# Patient Record
Sex: FEMALE | Race: BLACK OR AFRICAN AMERICAN | NOT HISPANIC OR LATINO
[De-identification: names, ages, dates, MRNs, and addresses within clinical notes are randomized per-mention and may not be internally consistent; named-entity substitution may affect disease eponyms.]

---

## 2018-12-11 ENCOUNTER — APPOINTMENT (OUTPATIENT)
Dept: SURGERY | Facility: CLINIC | Age: 23
End: 2018-12-11

## 2018-12-21 ENCOUNTER — APPOINTMENT (OUTPATIENT)
Dept: SURGERY | Facility: CLINIC | Age: 23
End: 2018-12-21

## 2019-01-29 ENCOUNTER — APPOINTMENT (OUTPATIENT)
Dept: SURGERY | Facility: CLINIC | Age: 24
End: 2019-01-29

## 2019-05-28 ENCOUNTER — APPOINTMENT (OUTPATIENT)
Dept: SURGERY | Facility: CLINIC | Age: 24
End: 2019-05-28

## 2019-07-23 ENCOUNTER — APPOINTMENT (OUTPATIENT)
Dept: SURGERY | Facility: CLINIC | Age: 24
End: 2019-07-23

## 2019-07-30 ENCOUNTER — APPOINTMENT (OUTPATIENT)
Dept: SURGERY | Facility: CLINIC | Age: 24
End: 2019-07-30
Payer: COMMERCIAL

## 2019-07-30 VITALS
WEIGHT: 250.5 LBS | DIASTOLIC BLOOD PRESSURE: 80 MMHG | HEIGHT: 62.5 IN | TEMPERATURE: 97.6 F | OXYGEN SATURATION: 99 % | BODY MASS INDEX: 44.94 KG/M2 | HEART RATE: 62 BPM | SYSTOLIC BLOOD PRESSURE: 118 MMHG

## 2019-07-30 DIAGNOSIS — E66.01 MORBID (SEVERE) OBESITY DUE TO EXCESS CALORIES: ICD-10-CM

## 2019-07-30 PROCEDURE — 99204 OFFICE O/P NEW MOD 45 MIN: CPT

## 2019-07-30 NOTE — END OF VISIT
[FreeTextEntry3] : All medical record entries made by the Scribe were at my, Dr. Banks's direction and personally dictated by me on 07/30/2019  I have reviewed the chart and agree that the record accurately reflects my personal performance of the history, physical exam, assessment and plan. I have also personally directed, reviewed, and agreed with the chart.\par \par

## 2019-07-30 NOTE — HISTORY OF PRESENT ILLNESS
[de-identified] : 24 y/o F presents here today for initial consultation for bariatric surgery. She initially consulted with  in 2016 but decided not to undergo the work up at the time. She states that she has struggled with her weight most of her life. She has attempted multiple diets, exercises with little success, often regaining any weight that she lost. She does not report any significant PMHx, not currently taking any medications. No PSHx. NKDA. Patient smokes recreational marijuana and is a social drinker. Additionally, she states that she has had 4 monthly weigh ins with her PCP and will send the results here. She is interested in VSG. She denies any acid reflux or heartburn.

## 2019-07-30 NOTE — PHYSICAL EXAM
[Obese, well nourished, in no acute distress] : obese, well nourished, in no acute distress [Normal] : affect appropriate [Obese] : obese

## 2019-07-30 NOTE — ASSESSMENT
[FreeTextEntry1] : 24 y/o F with interest in bariatric surgery. Discussed the surgical options available in bariatric surgery as well as the work up process required prior to determining which surgical treatment would be best for the patient. Risk, benefits, and alternatives to the proposed procedures were discussed with the patient and all questions were answered to their satisfaction. Patient is interested in VSG. Will have patient begin work up for this. She will continue to follow up here as she completes her work up studies and discuss the results and determine the plan for her bariatric surgery.\par

## 2019-07-30 NOTE — PLAN
[FreeTextEntry1] : Begin work up for bariatric surgery.\par Pt must meet  before receiving surgery date

## 2019-07-30 NOTE — ADDENDUM
[FreeTextEntry1] : Documented by Nakul Millard acting as a scribe for Dr. Trev Banks on 07/30/2019\par

## 2019-09-03 ENCOUNTER — APPOINTMENT (OUTPATIENT)
Dept: BARIATRICS | Facility: CLINIC | Age: 24
End: 2019-09-03

## 2019-09-05 ENCOUNTER — APPOINTMENT (OUTPATIENT)
Dept: BARIATRICS | Facility: CLINIC | Age: 24
End: 2019-09-05

## 2019-12-19 ENCOUNTER — APPOINTMENT (OUTPATIENT)
Dept: BARIATRICS | Facility: CLINIC | Age: 24
End: 2019-12-19

## 2020-01-21 ENCOUNTER — APPOINTMENT (OUTPATIENT)
Dept: BARIATRICS | Facility: CLINIC | Age: 25
End: 2020-01-21

## 2020-01-21 VITALS — WEIGHT: 241.13 LBS | BODY MASS INDEX: 43.26 KG/M2 | HEIGHT: 62.5 IN

## 2020-01-27 ENCOUNTER — APPOINTMENT (OUTPATIENT)
Dept: BARIATRICS | Facility: CLINIC | Age: 25
End: 2020-01-27
Payer: COMMERCIAL

## 2020-01-27 VITALS — WEIGHT: 236.13 LBS | HEIGHT: 62.5 IN | BODY MASS INDEX: 42.37 KG/M2

## 2020-01-27 PROCEDURE — 97802 MEDICAL NUTRITION INDIV IN: CPT

## 2020-01-31 ENCOUNTER — APPOINTMENT (OUTPATIENT)
Dept: SURGERY | Facility: CLINIC | Age: 25
End: 2020-01-31

## 2020-02-25 ENCOUNTER — APPOINTMENT (OUTPATIENT)
Dept: SURGERY | Facility: CLINIC | Age: 25
End: 2020-02-25

## 2020-07-21 ENCOUNTER — APPOINTMENT (OUTPATIENT)
Dept: SURGERY | Facility: CLINIC | Age: 25
End: 2020-07-21

## 2020-07-21 ENCOUNTER — TRANSCRIPTION ENCOUNTER (OUTPATIENT)
Age: 25
End: 2020-07-21

## 2021-01-22 ENCOUNTER — APPOINTMENT (OUTPATIENT)
Dept: SURGERY | Facility: CLINIC | Age: 26
End: 2021-01-22

## 2022-09-15 ENCOUNTER — APPOINTMENT (OUTPATIENT)
Dept: BARIATRICS | Facility: CLINIC | Age: 27
End: 2022-09-15
Payer: SELF-PAY

## 2022-09-15 VITALS
DIASTOLIC BLOOD PRESSURE: 84 MMHG | HEIGHT: 62 IN | TEMPERATURE: 97.2 F | BODY MASS INDEX: 44.16 KG/M2 | WEIGHT: 240 LBS | OXYGEN SATURATION: 84 % | SYSTOLIC BLOOD PRESSURE: 124 MMHG | HEART RATE: 66 BPM

## 2022-09-15 PROCEDURE — 99204 OFFICE O/P NEW MOD 45 MIN: CPT

## 2022-09-15 PROCEDURE — 99203 OFFICE O/P NEW LOW 30 MIN: CPT

## 2022-09-15 PROCEDURE — 99214 OFFICE O/P EST MOD 30 MIN: CPT

## 2022-09-28 NOTE — ASSESSMENT
[FreeTextEntry1] : Rohit is a 25 y/o F with a BMI of 43 and no previous medical or surgical history who is here today for initial bariatric consult.  Discussed all the options of bariatric surgery with the patient. As the patient has spoken to  and  initally, she is familiar with the bariatric workup and procedure. Will begin the bariatric workup and decide the course of surgery on completion.

## 2022-09-28 NOTE — ADDENDUM
[FreeTextEntry1] : Documented by Richie Valentin acting as a scribe for RICHIE Ibarra on 09/15/2022\par

## 2022-09-28 NOTE — HISTORY OF PRESENT ILLNESS
[de-identified] : Rohit is a 25 y/o F with a BMI of 43 and no previous medical or surgical history who is here today for initial bariatric consult. Seen by  and  initally. Had started the workup but discontinued due to pregnancy. Denies reflux and heartburn. \par \par We discussed at length surgical and non-surgical options and that non surgical approaches are unlikely to lead to long term, sustained weight loss. We also discussed that surgery alone is unlikely to be successful but should rather be seen as a tool for weight loss to be integrated with physical activity and nutritional counseling. The patient verbalized understanding and agrees to proceed with the evaluation. All risks of surgery were explained to the patient including the risks of leaks, infections, blood clots and death.\par \par

## 2022-09-28 NOTE — END OF VISIT
[FreeTextEntry3] : All medical record entries made by the Scribe were at my, RICHIE Ibarra , direction and personally dictated by me on 09/15/2022 . I have reviewed the chart and agree that the record accurately reflects my personal performance of the history, physical exam, assessment and plan. I have also personally directed, reviewed, and agreed with the chart.\par  [Time Spent: ___ minutes] : I have spent [unfilled] minutes of time on the encounter.

## 2022-11-09 ENCOUNTER — NON-APPOINTMENT (OUTPATIENT)
Age: 27
End: 2022-11-09

## 2022-11-09 VITALS — WEIGHT: 245 LBS | BODY MASS INDEX: 45.08 KG/M2 | HEIGHT: 62 IN

## 2022-11-29 ENCOUNTER — APPOINTMENT (OUTPATIENT)
Dept: BARIATRICS | Facility: CLINIC | Age: 27
End: 2022-11-29
Payer: MEDICAID

## 2022-11-29 PROCEDURE — 97803 MED NUTRITION INDIV SUBSEQ: CPT | Mod: 95

## 2022-12-07 ENCOUNTER — NON-APPOINTMENT (OUTPATIENT)
Age: 27
End: 2022-12-07

## 2022-12-17 VITALS — WEIGHT: 266 LBS | HEIGHT: 62 IN | BODY MASS INDEX: 48.95 KG/M2

## 2022-12-27 ENCOUNTER — APPOINTMENT (OUTPATIENT)
Dept: BARIATRICS | Facility: CLINIC | Age: 27
End: 2022-12-27

## 2022-12-27 ENCOUNTER — NON-APPOINTMENT (OUTPATIENT)
Age: 27
End: 2022-12-27

## 2023-01-23 VITALS — WEIGHT: 256.38 LBS | BODY MASS INDEX: 47.18 KG/M2 | HEIGHT: 62 IN

## 2023-01-27 ENCOUNTER — APPOINTMENT (OUTPATIENT)
Dept: BARIATRICS | Facility: CLINIC | Age: 28
End: 2023-01-27
Payer: MEDICAID

## 2023-01-27 PROCEDURE — 97803 MED NUTRITION INDIV SUBSEQ: CPT | Mod: 95

## 2023-02-06 ENCOUNTER — APPOINTMENT (OUTPATIENT)
Dept: BARIATRICS | Facility: CLINIC | Age: 28
End: 2023-02-06

## 2023-02-27 ENCOUNTER — APPOINTMENT (OUTPATIENT)
Dept: BARIATRICS | Facility: CLINIC | Age: 28
End: 2023-02-27

## 2023-04-05 ENCOUNTER — APPOINTMENT (OUTPATIENT)
Dept: PULMONOLOGY | Facility: CLINIC | Age: 28
End: 2023-04-05

## 2023-04-27 ENCOUNTER — APPOINTMENT (OUTPATIENT)
Dept: PULMONOLOGY | Facility: CLINIC | Age: 28
End: 2023-04-27

## 2023-05-12 VITALS — WEIGHT: 262 LBS | BODY MASS INDEX: 48.21 KG/M2 | HEIGHT: 62 IN

## 2023-06-15 VITALS — WEIGHT: 265 LBS | BODY MASS INDEX: 48.76 KG/M2 | HEIGHT: 62 IN

## 2023-07-15 VITALS — WEIGHT: 253 LBS | HEIGHT: 62 IN | BODY MASS INDEX: 46.56 KG/M2

## 2023-07-20 ENCOUNTER — APPOINTMENT (OUTPATIENT)
Dept: BARIATRICS | Facility: CLINIC | Age: 28
End: 2023-07-20
Payer: MEDICAID

## 2023-07-20 VITALS
TEMPERATURE: 97.6 F | SYSTOLIC BLOOD PRESSURE: 105 MMHG | OXYGEN SATURATION: 96 % | DIASTOLIC BLOOD PRESSURE: 72 MMHG | BODY MASS INDEX: 45.08 KG/M2 | WEIGHT: 245 LBS | HEART RATE: 71 BPM | HEIGHT: 62 IN

## 2023-07-20 PROCEDURE — 99214 OFFICE O/P EST MOD 30 MIN: CPT

## 2023-07-21 NOTE — HISTORY OF PRESENT ILLNESS
[de-identified] : Pt is a 26 y/o F with pmhx of morbid obesity BMI 44, who presents today feeling well here for f/u on her bariatric workup. Pt started the process last year and has completed all of her monthly weigh ins and nutrition education classes. Pt has not yet completed her remainder preoperative examinations and is requesting new orders to be provided to her today so she can continue through our bariatric process. Pt denies any GERD symptoms and expresses interest in the VSG. Pt has been very successful in losing wt in this preop process which we strongly enocurage. New orders to be provided to pt today and she will f/u upon completion. We discussed at length surgical and non-surgical options and that non surgical approaches are unlikely to lead to long term, sustained weight loss. We also discussed that surgery alone is unlikely to be successful but should rather be seen as a tool for weight loss to be integrated with physical activity and nutritional counseling. The patient verbalized understanding and agrees to proceed with the evaluation. All risks of surgery were explained to the patient including the risks of leaks, infections, blood clots and death.\par

## 2023-07-21 NOTE — PLAN
[FreeTextEntry1] : new bariatric orders, weigh ins and nutrition education classes completed\par f/u after testings are completed for final visit\par VSG

## 2023-07-21 NOTE — ASSESSMENT
[FreeTextEntry1] : Pt is a 28 y/o F with pmhx of morbid obesity BMI 44, who presents today feeling well here for f/u on her bariatric workup. Pt started the process last year and has completed all of her monthly weigh ins and nutrition education classes. Pt has not yet completed her remainder preoperative examinations and is requesting new orders to be provided to her today so she can continue through our bariatric process. Pt denies any GERD symptoms and expresses interest in the VSG. Pt has been very successful in losing wt in this preop process which we strongly enocurage. New orders to be provided to pt today and she will f/u upon completion. We discussed at length surgical and non-surgical options and that non surgical approaches are unlikely to lead to long term, sustained weight loss. We also discussed that surgery alone is unlikely to be successful but should rather be seen as a tool for weight loss to be integrated with physical activity and nutritional counseling. The patient verbalized understanding and agrees to proceed with the evaluation. All risks of surgery were explained to the patient including the risks of leaks, infections, blood clots and death.\par

## 2023-08-10 ENCOUNTER — APPOINTMENT (OUTPATIENT)
Dept: PULMONOLOGY | Facility: CLINIC | Age: 28
End: 2023-08-10

## 2023-08-11 ENCOUNTER — OUTPATIENT (OUTPATIENT)
Dept: OUTPATIENT SERVICES | Facility: HOSPITAL | Age: 28
LOS: 1 days | End: 2023-08-11
Payer: COMMERCIAL

## 2023-08-11 ENCOUNTER — APPOINTMENT (OUTPATIENT)
Dept: RADIOLOGY | Facility: HOSPITAL | Age: 28
End: 2023-08-11
Payer: MEDICAID

## 2023-08-11 PROCEDURE — 74240 X-RAY XM UPR GI TRC 1CNTRST: CPT | Mod: 26

## 2023-08-11 PROCEDURE — 74240 X-RAY XM UPR GI TRC 1CNTRST: CPT

## 2023-08-21 VITALS — WEIGHT: 254 LBS | HEIGHT: 60 IN | BODY MASS INDEX: 49.87 KG/M2

## 2023-08-21 VITALS — BODY MASS INDEX: 46.74 KG/M2 | HEIGHT: 62 IN | WEIGHT: 254 LBS

## 2023-08-23 ENCOUNTER — APPOINTMENT (OUTPATIENT)
Dept: PULMONOLOGY | Facility: CLINIC | Age: 28
End: 2023-08-23
Payer: MEDICAID

## 2023-08-23 VITALS
DIASTOLIC BLOOD PRESSURE: 25 MMHG | WEIGHT: 236 LBS | BODY MASS INDEX: 43.43 KG/M2 | HEIGHT: 62 IN | HEART RATE: 77 BPM | TEMPERATURE: 97.5 F | OXYGEN SATURATION: 96 % | SYSTOLIC BLOOD PRESSURE: 107 MMHG

## 2023-08-23 DIAGNOSIS — E66.01 MORBID (SEVERE) OBESITY DUE TO EXCESS CALORIES: ICD-10-CM

## 2023-08-23 DIAGNOSIS — R06.83 SNORING: ICD-10-CM

## 2023-08-23 PROCEDURE — 94727 GAS DIL/WSHOT DETER LNG VOL: CPT | Mod: 26

## 2023-08-23 PROCEDURE — 94010 BREATHING CAPACITY TEST: CPT | Mod: 26

## 2023-08-23 PROCEDURE — 99213 OFFICE O/P EST LOW 20 MIN: CPT | Mod: 25

## 2023-08-23 PROCEDURE — 94729 DIFFUSING CAPACITY: CPT | Mod: 26

## 2023-08-23 NOTE — PROCEDURE
[FreeTextEntry1] : pulmonary function testing today:  normal spirometry, volume changes c/w obesity, normal diffusion

## 2023-08-23 NOTE — HISTORY OF PRESENT ILLNESS
[FreeTextEntry1] : 08/23/2023 :  ROSALIA PEACE is a 27 year old female with morbid obesity who is here for initial evaluation for bariatric surgery. She smokes Marijuana for 2 years, no nicotine, denies any previous surgeries. She reports occasional snoring but denies apnea and hypersomnolence.    Sleep Routine: She goes to bed at 9, sleep latency is about 15 min, she wakes up once, WASO is about 5 min, and then she is up at 6A. She does not nap . No significant excessive daytime somnolence with [place] sleepiness scale (out of 24 points) = 5.   She denies cataplexy, RLS, parasomnia, or any other sleep behavioral issues.

## 2023-08-23 NOTE — REVIEW OF SYSTEMS
[EDS: ESS=____] : daytime somnolence: ESS=[unfilled] [Snoring] : snoring [Obesity] : obesity [Negative] : Psychiatric [Fatigue] : no fatigue [Witnessed Apneas] : no witnessed apnea

## 2023-08-23 NOTE — ASSESSMENT
[FreeTextEntry1] : 28 y/o morbid obese F who is here for bariatric surgery clearance. While in office she had PFT which was normal except for changes c/w body habitus.   1) Based on history and physical exam, sleep disordered breathing is at least moderately likely.  The patient was advised to have overnight polysomnography, and will be seen in follow up after testing. 2) RTC in 4 weeks

## 2023-08-23 NOTE — PHYSICAL EXAM
[General Appearance - In No Acute Distress] : no acute distress [Normal Oropharynx] : normal oropharynx [II] : II [Neck Appearance] : the appearance of the neck was normal [Apical Impulse] : the apical impulse was normal [Heart Sounds] : normal S1 and S2 [] : no respiratory distress [Exaggerated Use Of Accessory Muscles For Inspiration] : no accessory muscle use [Abnormal Walk] : normal gait [Involuntary Movements] : no involuntary movements were seen [No Focal Deficits] : no focal deficits [Oriented To Time, Place, And Person] : oriented to person, place, and time [Affect] : the affect was normal

## 2023-08-29 ENCOUNTER — NON-APPOINTMENT (OUTPATIENT)
Age: 28
End: 2023-08-29

## 2023-08-29 DIAGNOSIS — E55.9 VITAMIN D DEFICIENCY, UNSPECIFIED: ICD-10-CM

## 2023-08-29 DIAGNOSIS — Z00.00 ENCOUNTER FOR GENERAL ADULT MEDICAL EXAMINATION W/OUT ABNORMAL FINDINGS: ICD-10-CM

## 2023-08-29 LAB
25(OH)D3 SERPL-MCNC: 10.9 NG/ML
A-TOCOPHEROL VIT E SERPL-MCNC: 8.7 MG/L
ALBUMIN SERPL ELPH-MCNC: 4.6 G/DL
ALP BLD-CCNC: 60 U/L
ALT SERPL-CCNC: 8 U/L
ANION GAP SERPL CALC-SCNC: 13 MMOL/L
APPEARANCE: CLEAR
AST SERPL-CCNC: 13 U/L
BACTERIA: NEGATIVE /HPF
BETA+GAMMA TOCOPHEROL SERPL-MCNC: 0.9 MG/L
BILIRUB SERPL-MCNC: 0.4 MG/DL
BILIRUBIN URINE: NEGATIVE
BLOOD URINE: ABNORMAL
BUN SERPL-MCNC: 13 MG/DL
CA-I SERPL-SCNC: 5 MG/DL
CALCIUM SERPL-MCNC: 9.3 MG/DL
CALCIUM SERPL-MCNC: 9.3 MG/DL
CAST: 0 /LPF
CHLORIDE SERPL-SCNC: 103 MMOL/L
CHOLEST SERPL-MCNC: 142 MG/DL
CO2 SERPL-SCNC: 24 MMOL/L
COLOR: YELLOW
CREAT SERPL-MCNC: 0.78 MG/DL
EGFR: 106 ML/MIN/1.73M2
EPITHELIAL CELLS: 4 /HPF
ESTIMATED AVERAGE GLUCOSE: 85 MG/DL
FOLATE SERPL-MCNC: 6 NG/ML
GLUCOSE QUALITATIVE U: NEGATIVE MG/DL
GLUCOSE SERPL-MCNC: 85 MG/DL
HBA1C MFR BLD HPLC: 4.6 %
HCG SERPL QL: NEGATIVE
HDLC SERPL-MCNC: 34 MG/DL
INR PPP: 0.99 RATIO
IRON SATN MFR SERPL: 18 %
IRON SERPL-MCNC: 58 UG/DL
KETONES URINE: NEGATIVE MG/DL
LDLC SERPL CALC-MCNC: 97 MG/DL
LEUKOCYTE ESTERASE URINE: NEGATIVE
MICROSCOPIC-UA: NORMAL
NITRITE URINE: NEGATIVE
NONHDLC SERPL-MCNC: 109 MG/DL
PAPP-A SERPL-ACNC: <1 MIU/ML
PARATHYROID HORMONE INTACT: 34 PG/ML
PH URINE: 5.5
POTASSIUM SERPL-SCNC: 4.7 MMOL/L
PREALB SERPL NEPH-MCNC: 20 MG/DL
PROT SERPL-MCNC: 7.5 G/DL
PROTEIN URINE: NEGATIVE MG/DL
PT BLD: 11.2 SEC
RED BLOOD CELLS URINE: 2 /HPF
REVIEW: NORMAL
SODIUM SERPL-SCNC: 141 MMOL/L
SPECIFIC GRAVITY URINE: 1.02
TIBC SERPL-MCNC: 325 UG/DL
TRIGL SERPL-MCNC: 52 MG/DL
TSH SERPL-ACNC: 0.71 UIU/ML
UIBC SERPL-MCNC: 266 UG/DL
UREA BREATH TEST QL: NEGATIVE
UROBILINOGEN URINE: 0.2 MG/DL
VIT A SERPL-MCNC: 33.8 UG/DL
VIT B1 SERPL-MCNC: 107.6 NMOL/L
VIT B12 SERPL-MCNC: 623 PG/ML
WHITE BLOOD CELLS URINE: 1 /HPF
ZINC SERPL-MCNC: 69 UG/DL

## 2023-08-29 RX ORDER — ERGOCALCIFEROL 1.25 MG/1
1.25 MG CAPSULE, LIQUID FILLED ORAL
Qty: 12 | Refills: 0 | Status: ACTIVE | COMMUNITY
Start: 2023-08-29 | End: 1900-01-01

## 2023-12-21 ENCOUNTER — OUTPATIENT (OUTPATIENT)
Dept: OUTPATIENT SERVICES | Facility: HOSPITAL | Age: 28
LOS: 1 days | End: 2023-12-21
Payer: COMMERCIAL

## 2023-12-21 ENCOUNTER — APPOINTMENT (OUTPATIENT)
Dept: SLEEP CENTER | Facility: HOME HEALTH | Age: 28
End: 2023-12-21
Payer: MEDICAID

## 2023-12-21 PROCEDURE — 95800 SLP STDY UNATTENDED: CPT | Mod: 26

## 2023-12-21 PROCEDURE — 95800 SLP STDY UNATTENDED: CPT

## 2023-12-26 DIAGNOSIS — G47.33 OBSTRUCTIVE SLEEP APNEA (ADULT) (PEDIATRIC): ICD-10-CM

## 2024-01-23 ENCOUNTER — APPOINTMENT (OUTPATIENT)
Dept: PULMONOLOGY | Facility: CLINIC | Age: 29
End: 2024-01-23
Payer: MEDICAID

## 2024-01-23 PROCEDURE — 99442: CPT

## 2024-01-23 NOTE — ASSESSMENT
[FreeTextEntry1] : Normal pulmonary function, minimal sleep disordered breathing expected to improve with weight loss following bariatric surgery.  No treatment required.  Cleared for planned bariatric surgery.

## 2024-01-23 NOTE — REASON FOR VISIT
[Home] : at home, [unfilled] , at the time of the visit. [Medical Office: ([facility])___] : at the medical office located in  [Verbal consent obtained from patient] : the patient, [unfilled]

## 2024-01-23 NOTE — HISTORY OF PRESENT ILLNESS
[FreeTextEntry1] : Pulmonary function test and home sleep study done as part of presurgical clearance for bariatric surgery.  Pulmonary function testing normal other than expected changes from obesity, Home sleep study reviewed with patient today: Minimal sleep disordered breathing, apnea-hypopnea index 3.2 (4% criteria), 12.3 (3%).  No new problems or complaints.

## 2024-02-01 ENCOUNTER — RESULT REVIEW (OUTPATIENT)
Age: 29
End: 2024-02-01

## 2024-02-01 ENCOUNTER — APPOINTMENT (OUTPATIENT)
Dept: BARIATRICS | Facility: CLINIC | Age: 29
End: 2024-02-01
Payer: SELF-PAY

## 2024-02-01 ENCOUNTER — OUTPATIENT (OUTPATIENT)
Dept: OUTPATIENT SERVICES | Facility: HOSPITAL | Age: 29
LOS: 1 days | End: 2024-02-01
Payer: COMMERCIAL

## 2024-02-01 VITALS
HEIGHT: 62 IN | OXYGEN SATURATION: 99 % | WEIGHT: 228 LBS | HEART RATE: 80 BPM | DIASTOLIC BLOOD PRESSURE: 76 MMHG | TEMPERATURE: 97.9 F | BODY MASS INDEX: 41.96 KG/M2 | SYSTOLIC BLOOD PRESSURE: 107 MMHG

## 2024-02-01 DIAGNOSIS — Z01.818 ENCOUNTER FOR OTHER PREPROCEDURAL EXAMINATION: ICD-10-CM

## 2024-02-01 PROCEDURE — 71046 X-RAY EXAM CHEST 2 VIEWS: CPT | Mod: 26

## 2024-02-01 PROCEDURE — 71046 X-RAY EXAM CHEST 2 VIEWS: CPT

## 2024-02-01 PROCEDURE — 93005 ELECTROCARDIOGRAM TRACING: CPT

## 2024-02-01 PROCEDURE — 99214 OFFICE O/P EST MOD 30 MIN: CPT

## 2024-02-01 PROCEDURE — 93010 ELECTROCARDIOGRAM REPORT: CPT | Mod: NC

## 2024-02-01 NOTE — HISTORY OF PRESENT ILLNESS
[de-identified] : Pt is a 29 y/o female with no past medical history presents today for a final visit. Pre- op and post-op instructions given. The procedure was discussed in great detail including the risks of surgery such as leaks, blood clots, and death. All questions were answered, and the patient has no other concerns at this time. We also advised patient to prevent pregnancy until 18 months after bariatric surgery. Labs and UGI reviewed showing no signs of limitations. No contraindication to proceed. The patient is scheduled for a VSG.

## 2024-02-01 NOTE — ASSESSMENT
[FreeTextEntry1] : Pt is a 29 y/o female with no past medical history presents today for a final visit. Pre- op and post-op instructions given. The procedure was discussed in great detail including the risks of surgery such as leaks, blood clots, and death. All questions were answered, and the patient has no other concerns at this time. We also advised patient to prevent pregnancy until 18 months after bariatric surgery. Labs and UGI reviewed showing no signs of limitations. No contraindication to proceed. The patient is scheduled for a VSG.

## 2024-02-01 NOTE — ADDENDUM
[FreeTextEntry1] :  Documented by Amaury Hodges acting as a scribe for RICHIE Yost  on 02/01/2024  .

## 2024-02-01 NOTE — END OF VISIT
[Time Spent: ___ minutes] : I have spent [unfilled] minutes of time on the encounter. [FreeTextEntry3] :  All medical record entries made by the Scribe were at my, RICHIE Ibarra , direction and personally dictated by me on 02/01/2024 . I have reviewed the chart and agree that the record accurately reflects my personal performance of the history, physical exam, assessment and plan. I have also personally directed, reviewed, and agreed with the chart.

## 2024-02-06 ENCOUNTER — APPOINTMENT (OUTPATIENT)
Dept: BARIATRICS | Facility: CLINIC | Age: 29
End: 2024-02-06

## 2024-02-13 ENCOUNTER — APPOINTMENT (OUTPATIENT)
Dept: BARIATRICS | Facility: CLINIC | Age: 29
End: 2024-02-13

## 2024-02-14 ENCOUNTER — NON-APPOINTMENT (OUTPATIENT)
Age: 29
End: 2024-02-14

## 2024-02-20 ENCOUNTER — NON-APPOINTMENT (OUTPATIENT)
Age: 29
End: 2024-02-20

## 2024-02-20 ENCOUNTER — APPOINTMENT (OUTPATIENT)
Dept: BARIATRICS | Facility: CLINIC | Age: 29
End: 2024-02-20

## 2024-02-20 ENCOUNTER — TRANSCRIPTION ENCOUNTER (OUTPATIENT)
Age: 29
End: 2024-02-20

## 2024-02-20 NOTE — PATIENT PROFILE ADULT - FALL HARM RISK - UNIVERSAL INTERVENTIONS
Bed in lowest position, wheels locked, appropriate side rails in place/Call bell, personal items and telephone in reach/Instruct patient to call for assistance before getting out of bed or chair/Non-slip footwear when patient is out of bed/[place] to call system/Physically safe environment - no spills, clutter or unnecessary equipment/Purposeful Proactive Rounding/Room/bathroom lighting operational, light cord in reach

## 2024-02-21 ENCOUNTER — RESULT REVIEW (OUTPATIENT)
Age: 29
End: 2024-02-21

## 2024-02-21 ENCOUNTER — INPATIENT (INPATIENT)
Facility: HOSPITAL | Age: 29
LOS: 2 days | Discharge: HOME | DRG: 620 | End: 2024-02-24
Attending: GENERAL ACUTE CARE HOSPITAL | Admitting: GENERAL ACUTE CARE HOSPITAL
Payer: COMMERCIAL

## 2024-02-21 ENCOUNTER — APPOINTMENT (OUTPATIENT)
Dept: BARIATRICS | Facility: HOSPITAL | Age: 29
End: 2024-02-21
Payer: COMMERCIAL

## 2024-02-21 VITALS
TEMPERATURE: 98 F | OXYGEN SATURATION: 99 % | RESPIRATION RATE: 14 BRPM | SYSTOLIC BLOOD PRESSURE: 120 MMHG | HEART RATE: 69 BPM | WEIGHT: 224.21 LBS | DIASTOLIC BLOOD PRESSURE: 82 MMHG | HEIGHT: 63 IN

## 2024-02-21 DIAGNOSIS — Z98.890 OTHER SPECIFIED POSTPROCEDURAL STATES: Chronic | ICD-10-CM

## 2024-02-21 LAB
BLD GP AB SCN SERPL QL: NEGATIVE — SIGNIFICANT CHANGE UP
HCT VFR BLD CALC: 33.5 % — LOW (ref 34.5–45)
HGB BLD-MCNC: 11.1 G/DL — LOW (ref 11.5–15.5)
MCHC RBC-ENTMCNC: 27.6 PG — SIGNIFICANT CHANGE UP (ref 27–34)
MCHC RBC-ENTMCNC: 33.1 GM/DL — SIGNIFICANT CHANGE UP (ref 32–36)
MCV RBC AUTO: 83.3 FL — SIGNIFICANT CHANGE UP (ref 80–100)
NRBC # BLD: 0 /100 WBCS — SIGNIFICANT CHANGE UP (ref 0–0)
PLATELET # BLD AUTO: 304 K/UL — SIGNIFICANT CHANGE UP (ref 150–400)
RBC # BLD: 4.02 M/UL — SIGNIFICANT CHANGE UP (ref 3.8–5.2)
RBC # FLD: 13 % — SIGNIFICANT CHANGE UP (ref 10.3–14.5)
RH IG SCN BLD-IMP: POSITIVE — SIGNIFICANT CHANGE UP
WBC # BLD: 26.91 K/UL — HIGH (ref 3.8–10.5)
WBC # FLD AUTO: 26.91 K/UL — HIGH (ref 3.8–10.5)

## 2024-02-21 PROCEDURE — 99221 1ST HOSP IP/OBS SF/LOW 40: CPT | Mod: 25

## 2024-02-21 PROCEDURE — 43282 LAP PARAESOPH HER RPR W/MESH: CPT | Mod: AS,59

## 2024-02-21 PROCEDURE — 43775 LAP SLEEVE GASTRECTOMY: CPT

## 2024-02-21 PROCEDURE — 43282 LAP PARAESOPH HER RPR W/MESH: CPT | Mod: 59

## 2024-02-21 PROCEDURE — 88307 TISSUE EXAM BY PATHOLOGIST: CPT | Mod: 26

## 2024-02-21 PROCEDURE — 43775 LAP SLEEVE GASTRECTOMY: CPT | Mod: AS

## 2024-02-21 DEVICE — MESH PHASIX ST 7X10CM: Type: IMPLANTABLE DEVICE | Status: FUNCTIONAL

## 2024-02-21 DEVICE — XI STAPLER SUREFORM RELOAD 60 BLUE: Type: IMPLANTABLE DEVICE | Status: FUNCTIONAL

## 2024-02-21 RX ORDER — ONDANSETRON 8 MG/1
4 TABLET, FILM COATED ORAL ONCE
Refills: 0 | Status: COMPLETED | OUTPATIENT
Start: 2024-02-21 | End: 2024-02-21

## 2024-02-21 RX ORDER — HYDROMORPHONE HYDROCHLORIDE 2 MG/ML
0.5 INJECTION INTRAMUSCULAR; INTRAVENOUS; SUBCUTANEOUS
Refills: 0 | Status: DISCONTINUED | OUTPATIENT
Start: 2024-02-21 | End: 2024-02-21

## 2024-02-21 RX ORDER — ACETAMINOPHEN 500 MG
1000 TABLET ORAL ONCE
Refills: 0 | Status: COMPLETED | OUTPATIENT
Start: 2024-02-21 | End: 2024-02-21

## 2024-02-21 RX ORDER — OXYCODONE HYDROCHLORIDE 5 MG/1
10 TABLET ORAL EVERY 6 HOURS
Refills: 0 | Status: DISCONTINUED | OUTPATIENT
Start: 2024-02-21 | End: 2024-02-24

## 2024-02-21 RX ORDER — CEFOTETAN DISODIUM 1 G
2 VIAL (EA) INJECTION EVERY 12 HOURS
Refills: 0 | Status: COMPLETED | OUTPATIENT
Start: 2024-02-21 | End: 2024-02-22

## 2024-02-21 RX ORDER — OXYCODONE HYDROCHLORIDE 5 MG/1
5 TABLET ORAL EVERY 6 HOURS
Refills: 0 | Status: DISCONTINUED | OUTPATIENT
Start: 2024-02-21 | End: 2024-02-24

## 2024-02-21 RX ORDER — SCOPALAMINE 1 MG/3D
1 PATCH, EXTENDED RELEASE TRANSDERMAL ONCE
Refills: 0 | Status: COMPLETED | OUTPATIENT
Start: 2024-02-21 | End: 2024-02-21

## 2024-02-21 RX ORDER — SODIUM CHLORIDE 9 MG/ML
1000 INJECTION, SOLUTION INTRAVENOUS
Refills: 0 | Status: DISCONTINUED | OUTPATIENT
Start: 2024-02-21 | End: 2024-02-23

## 2024-02-21 RX ORDER — ACETAMINOPHEN 500 MG
1000 TABLET ORAL EVERY 6 HOURS
Refills: 0 | Status: DISCONTINUED | OUTPATIENT
Start: 2024-02-21 | End: 2024-02-24

## 2024-02-21 RX ORDER — ONDANSETRON 8 MG/1
4 TABLET, FILM COATED ORAL EVERY 6 HOURS
Refills: 0 | Status: DISCONTINUED | OUTPATIENT
Start: 2024-02-21 | End: 2024-02-24

## 2024-02-21 RX ORDER — HYDROMORPHONE HYDROCHLORIDE 2 MG/ML
0.5 INJECTION INTRAMUSCULAR; INTRAVENOUS; SUBCUTANEOUS ONCE
Refills: 0 | Status: DISCONTINUED | OUTPATIENT
Start: 2024-02-21 | End: 2024-02-21

## 2024-02-21 RX ORDER — THIAMINE MONONITRATE (VIT B1) 100 MG
500 TABLET ORAL EVERY 24 HOURS
Refills: 0 | Status: COMPLETED | OUTPATIENT
Start: 2024-02-21 | End: 2024-02-22

## 2024-02-21 RX ORDER — PANTOPRAZOLE SODIUM 20 MG/1
40 TABLET, DELAYED RELEASE ORAL DAILY
Refills: 0 | Status: DISCONTINUED | OUTPATIENT
Start: 2024-02-21 | End: 2024-02-24

## 2024-02-21 RX ORDER — ENOXAPARIN SODIUM 100 MG/ML
40 INJECTION SUBCUTANEOUS ONCE
Refills: 0 | Status: COMPLETED | OUTPATIENT
Start: 2024-02-21 | End: 2024-02-21

## 2024-02-21 RX ADMIN — ENOXAPARIN SODIUM 40 MILLIGRAM(S): 100 INJECTION SUBCUTANEOUS at 10:33

## 2024-02-21 RX ADMIN — HYDROMORPHONE HYDROCHLORIDE 0.5 MILLIGRAM(S): 2 INJECTION INTRAMUSCULAR; INTRAVENOUS; SUBCUTANEOUS at 21:10

## 2024-02-21 RX ADMIN — SCOPALAMINE 1 PATCH: 1 PATCH, EXTENDED RELEASE TRANSDERMAL at 10:34

## 2024-02-21 RX ADMIN — HYDROMORPHONE HYDROCHLORIDE 0.5 MILLIGRAM(S): 2 INJECTION INTRAMUSCULAR; INTRAVENOUS; SUBCUTANEOUS at 20:55

## 2024-02-21 RX ADMIN — Medication 100 GRAM(S): at 19:59

## 2024-02-21 RX ADMIN — HYDROMORPHONE HYDROCHLORIDE 0.5 MILLIGRAM(S): 2 INJECTION INTRAMUSCULAR; INTRAVENOUS; SUBCUTANEOUS at 23:40

## 2024-02-21 RX ADMIN — Medication 1000 MILLIGRAM(S): at 10:33

## 2024-02-21 RX ADMIN — ONDANSETRON 4 MILLIGRAM(S): 8 TABLET, FILM COATED ORAL at 18:34

## 2024-02-21 RX ADMIN — HYDROMORPHONE HYDROCHLORIDE 0.5 MILLIGRAM(S): 2 INJECTION INTRAMUSCULAR; INTRAVENOUS; SUBCUTANEOUS at 23:25

## 2024-02-21 RX ADMIN — Medication 105 MILLIGRAM(S): at 14:59

## 2024-02-21 RX ADMIN — OXYCODONE HYDROCHLORIDE 10 MILLIGRAM(S): 5 TABLET ORAL at 22:27

## 2024-02-21 RX ADMIN — Medication 400 MILLIGRAM(S): at 18:49

## 2024-02-21 RX ADMIN — ONDANSETRON 4 MILLIGRAM(S): 8 TABLET, FILM COATED ORAL at 19:57

## 2024-02-21 RX ADMIN — OXYCODONE HYDROCHLORIDE 10 MILLIGRAM(S): 5 TABLET ORAL at 23:27

## 2024-02-21 NOTE — H&P ADULT - BIRTH SEX
Female Topical Sulfur Applications Counseling: Topical Sulfur Counseling: Patient counseled that this medication may cause skin irritation or allergic reactions.  In the event of skin irritation, the patient was advised to reduce the amount of the drug applied or use it less frequently.   The patient verbalized understanding of the proper use and possible adverse effects of topical sulfur application.  All of the patient's questions and concerns were addressed.

## 2024-02-21 NOTE — BRIEF OPERATIVE NOTE - NSICDXBRIEFPROCEDURE_GEN_ALL_CORE_FT
PROCEDURES:  Gastrectomy, sleeve 21-Feb-2024 13:35:43  Yony Templeton  Robot-assisted repair of hiatal hernia 21-Feb-2024 13:35:51  Yony Templeton

## 2024-02-21 NOTE — BRIEF OPERATIVE NOTE - NSICDXBRIEFPOSTOP_GEN_ALL_CORE_FT
POST-OP DIAGNOSIS:  Hiatal hernia 21-Feb-2024 13:36:15  Yony Templeton  Morbid obesity 21-Feb-2024 13:36:21  Yony Templeton

## 2024-02-21 NOTE — BRIEF OPERATIVE NOTE - OPERATION/FINDINGS
Varess needle insufflation, robotic ports placed under direct visualization, gastrohepatic ligament dissected exposing R augustine. Continued dissection laterally along greater curvature to L augustine. Penrose placed to assist in dissection to the mediastinum. Anterior and posterior vagus nerves identified and preserved. Phasix mesh placed and secured with 2-0 ethibond. The attention was placed to performing sleeve gastrectomy, gastrocolic ligament divided 5cm away from pylorus and up until left crura. Sleeve modeled with 40Fr bougie and staple across. Hemostasis achieved. Gastrectomy specimen retrived and passed off. Skin closed with 4-0 monocryl and dermabond.

## 2024-02-21 NOTE — H&P ADULT - ASSESSMENT
28F pmhx of obesity and pshx of abdominoplasty presenting for sleeve gastrectomy    Proceed to OR   admit to Dr. Lozano post op

## 2024-02-22 ENCOUNTER — TRANSCRIPTION ENCOUNTER (OUTPATIENT)
Age: 29
End: 2024-02-22

## 2024-02-22 LAB
ANION GAP SERPL CALC-SCNC: 10 MMOL/L — SIGNIFICANT CHANGE UP (ref 5–17)
BASOPHILS # BLD AUTO: 0.02 K/UL — SIGNIFICANT CHANGE UP (ref 0–0.2)
BASOPHILS NFR BLD AUTO: 0.1 % — SIGNIFICANT CHANGE UP (ref 0–2)
BUN SERPL-MCNC: 16 MG/DL — SIGNIFICANT CHANGE UP (ref 7–23)
CALCIUM SERPL-MCNC: 8.7 MG/DL — SIGNIFICANT CHANGE UP (ref 8.4–10.5)
CHLORIDE SERPL-SCNC: 106 MMOL/L — SIGNIFICANT CHANGE UP (ref 96–108)
CO2 SERPL-SCNC: 27 MMOL/L — SIGNIFICANT CHANGE UP (ref 22–31)
CREAT SERPL-MCNC: 1.11 MG/DL — SIGNIFICANT CHANGE UP (ref 0.5–1.3)
EGFR: 69 ML/MIN/1.73M2 — SIGNIFICANT CHANGE UP
EOSINOPHIL # BLD AUTO: 0 K/UL — SIGNIFICANT CHANGE UP (ref 0–0.5)
EOSINOPHIL NFR BLD AUTO: 0 % — SIGNIFICANT CHANGE UP (ref 0–6)
GLUCOSE SERPL-MCNC: 118 MG/DL — HIGH (ref 70–99)
HCT VFR BLD CALC: 23.9 % — LOW (ref 34.5–45)
HCT VFR BLD CALC: 24.2 % — LOW (ref 34.5–45)
HGB BLD-MCNC: 7.8 G/DL — LOW (ref 11.5–15.5)
HGB BLD-MCNC: 8.1 G/DL — LOW (ref 11.5–15.5)
IMM GRANULOCYTES NFR BLD AUTO: 0.5 % — SIGNIFICANT CHANGE UP (ref 0–0.9)
LYMPHOCYTES # BLD AUTO: 1.41 K/UL — SIGNIFICANT CHANGE UP (ref 1–3.3)
LYMPHOCYTES # BLD AUTO: 7.7 % — LOW (ref 13–44)
MAGNESIUM SERPL-MCNC: 1.9 MG/DL — SIGNIFICANT CHANGE UP (ref 1.6–2.6)
MCHC RBC-ENTMCNC: 27.5 PG — SIGNIFICANT CHANGE UP (ref 27–34)
MCHC RBC-ENTMCNC: 27.9 PG — SIGNIFICANT CHANGE UP (ref 27–34)
MCHC RBC-ENTMCNC: 32.6 GM/DL — SIGNIFICANT CHANGE UP (ref 32–36)
MCHC RBC-ENTMCNC: 33.5 GM/DL — SIGNIFICANT CHANGE UP (ref 32–36)
MCV RBC AUTO: 83.4 FL — SIGNIFICANT CHANGE UP (ref 80–100)
MCV RBC AUTO: 84.2 FL — SIGNIFICANT CHANGE UP (ref 80–100)
MONOCYTES # BLD AUTO: 0.8 K/UL — SIGNIFICANT CHANGE UP (ref 0–0.9)
MONOCYTES NFR BLD AUTO: 4.4 % — SIGNIFICANT CHANGE UP (ref 2–14)
NEUTROPHILS # BLD AUTO: 16.06 K/UL — HIGH (ref 1.8–7.4)
NEUTROPHILS NFR BLD AUTO: 87.3 % — HIGH (ref 43–77)
NRBC # BLD: 0 /100 WBCS — SIGNIFICANT CHANGE UP (ref 0–0)
NRBC # BLD: 0 /100 WBCS — SIGNIFICANT CHANGE UP (ref 0–0)
PHOSPHATE SERPL-MCNC: 4.1 MG/DL — SIGNIFICANT CHANGE UP (ref 2.5–4.5)
PLATELET # BLD AUTO: 212 K/UL — SIGNIFICANT CHANGE UP (ref 150–400)
PLATELET # BLD AUTO: 220 K/UL — SIGNIFICANT CHANGE UP (ref 150–400)
POTASSIUM SERPL-MCNC: 4.7 MMOL/L — SIGNIFICANT CHANGE UP (ref 3.5–5.3)
POTASSIUM SERPL-SCNC: 4.7 MMOL/L — SIGNIFICANT CHANGE UP (ref 3.5–5.3)
RBC # BLD: 2.84 M/UL — LOW (ref 3.8–5.2)
RBC # BLD: 2.9 M/UL — LOW (ref 3.8–5.2)
RBC # FLD: 13 % — SIGNIFICANT CHANGE UP (ref 10.3–14.5)
RBC # FLD: 13 % — SIGNIFICANT CHANGE UP (ref 10.3–14.5)
SODIUM SERPL-SCNC: 143 MMOL/L — SIGNIFICANT CHANGE UP (ref 135–145)
WBC # BLD: 18.38 K/UL — HIGH (ref 3.8–10.5)
WBC # BLD: 20.19 K/UL — HIGH (ref 3.8–10.5)
WBC # FLD AUTO: 18.38 K/UL — HIGH (ref 3.8–10.5)
WBC # FLD AUTO: 20.19 K/UL — HIGH (ref 3.8–10.5)

## 2024-02-22 RX ORDER — HYDROMORPHONE HYDROCHLORIDE 2 MG/ML
0.25 INJECTION INTRAMUSCULAR; INTRAVENOUS; SUBCUTANEOUS ONCE
Refills: 0 | Status: DISCONTINUED | OUTPATIENT
Start: 2024-02-22 | End: 2024-02-22

## 2024-02-22 RX ORDER — ONDANSETRON 8 MG/1
4 TABLET, FILM COATED ORAL ONCE
Refills: 0 | Status: COMPLETED | OUTPATIENT
Start: 2024-02-22 | End: 2024-02-22

## 2024-02-22 RX ORDER — KETOROLAC TROMETHAMINE 30 MG/ML
15 SYRINGE (ML) INJECTION EVERY 6 HOURS
Refills: 0 | Status: DISCONTINUED | OUTPATIENT
Start: 2024-02-22 | End: 2024-02-22

## 2024-02-22 RX ORDER — HYOSCYAMINE SULFATE 0.13 MG
0.12 TABLET ORAL EVERY 6 HOURS
Refills: 0 | Status: DISCONTINUED | OUTPATIENT
Start: 2024-02-22 | End: 2024-02-24

## 2024-02-22 RX ORDER — ACETAMINOPHEN 500 MG
1000 TABLET ORAL ONCE
Refills: 0 | Status: COMPLETED | OUTPATIENT
Start: 2024-02-22 | End: 2024-02-22

## 2024-02-22 RX ORDER — ONDANSETRON 8 MG/1
4 TABLET, FILM COATED ORAL ONCE
Refills: 0 | Status: COMPLETED | OUTPATIENT
Start: 2024-02-22 | End: 2024-02-23

## 2024-02-22 RX ADMIN — Medication 0.12 MILLIGRAM(S): at 21:05

## 2024-02-22 RX ADMIN — Medication 200 MILLIGRAM(S): at 09:49

## 2024-02-22 RX ADMIN — Medication 400 MILLIGRAM(S): at 21:00

## 2024-02-22 RX ADMIN — Medication 15 MILLIGRAM(S): at 02:34

## 2024-02-22 RX ADMIN — Medication 100 GRAM(S): at 07:36

## 2024-02-22 RX ADMIN — HYDROMORPHONE HYDROCHLORIDE 0.25 MILLIGRAM(S): 2 INJECTION INTRAMUSCULAR; INTRAVENOUS; SUBCUTANEOUS at 05:50

## 2024-02-22 RX ADMIN — Medication 400 MILLIGRAM(S): at 00:26

## 2024-02-22 RX ADMIN — HYDROMORPHONE HYDROCHLORIDE 0.25 MILLIGRAM(S): 2 INJECTION INTRAMUSCULAR; INTRAVENOUS; SUBCUTANEOUS at 03:15

## 2024-02-22 RX ADMIN — Medication 0.12 MILLIGRAM(S): at 14:59

## 2024-02-22 RX ADMIN — HYDROMORPHONE HYDROCHLORIDE 0.25 MILLIGRAM(S): 2 INJECTION INTRAMUSCULAR; INTRAVENOUS; SUBCUTANEOUS at 03:30

## 2024-02-22 RX ADMIN — ONDANSETRON 4 MILLIGRAM(S): 8 TABLET, FILM COATED ORAL at 18:18

## 2024-02-22 RX ADMIN — ONDANSETRON 4 MILLIGRAM(S): 8 TABLET, FILM COATED ORAL at 03:28

## 2024-02-22 RX ADMIN — PANTOPRAZOLE SODIUM 40 MILLIGRAM(S): 20 TABLET, DELAYED RELEASE ORAL at 14:19

## 2024-02-22 RX ADMIN — Medication 400 MILLIGRAM(S): at 05:32

## 2024-02-22 RX ADMIN — Medication 1000 MILLIGRAM(S): at 21:15

## 2024-02-22 RX ADMIN — Medication 1000 MILLIGRAM(S): at 05:48

## 2024-02-22 RX ADMIN — SCOPALAMINE 1 PATCH: 1 PATCH, EXTENDED RELEASE TRANSDERMAL at 06:58

## 2024-02-22 RX ADMIN — Medication 1000 MILLIGRAM(S): at 00:42

## 2024-02-22 RX ADMIN — Medication 105 MILLIGRAM(S): at 14:59

## 2024-02-22 RX ADMIN — Medication 15 MILLIGRAM(S): at 07:20

## 2024-02-22 RX ADMIN — ONDANSETRON 4 MILLIGRAM(S): 8 TABLET, FILM COATED ORAL at 05:32

## 2024-02-22 RX ADMIN — HYDROMORPHONE HYDROCHLORIDE 0.25 MILLIGRAM(S): 2 INJECTION INTRAMUSCULAR; INTRAVENOUS; SUBCUTANEOUS at 05:34

## 2024-02-22 NOTE — DIETITIAN INITIAL EVALUATION ADULT - PERSON TAUGHT/METHOD
RD Discussed volumes of various cup sizes on tray table and encouraged aiming for 4 oz/hr as tolerated. Pt is prepared with protein shakes, with plan to get vitamins after discharge. RD provided indepth edu on diet advancement and specific nutrient needs status post sleeve gastrectomy. Pt appears receptive, verbalized understanding. Written nutrition education handouts provided./verbal instruction/written material/patient instructed

## 2024-02-22 NOTE — DIETITIAN INITIAL EVALUATION ADULT - OTHER CALCULATIONS
Above energy needs calculated for wt maintenance (20-25kcal/kg ideal body weight).  Weeks 1-2 estimated needs: 523-627calories/day (10-12kcal/kg ideal body weight), 63-78g protein/day (1.2-1.5g/kg ideal body weight), >/=64oz clear fluids.

## 2024-02-22 NOTE — DISCHARGE NOTE PROVIDER - CARE PROVIDER_API CALL
Raymundo Lozano  Surgery  [address] [place] [id number]  Phone: [phone]  Fax: [phone]  Scheduled Appointment: 03/07/2024

## 2024-02-22 NOTE — DISCHARGE NOTE PROVIDER - NSDCFUADDINST_GEN_ALL_CORE_FT
Please follow up with Dr. Lozano at your scheduled appointment. Please call [phone] for any further questions. You may shower; soap and water over incision sites. Do not scrub. Pat dry when done. No tub bathing or swimming until cleared. Keep incision sites out of the sun as scars will darken. No heavy lifting (>10lbs) or strenuous exercise. Diet: Bariatric Clear Fluids. 60 grams protein daily.  64 fluid ounces water daily. Drink small sips throughout the day. Continue diet as outlined by paperwork received as a pre-operative patient. You should be urinating at least 3-4x per day. Call the office if you experience increasing abdominal pain, nausea, vomiting, or temperature >100.4F.      New Medications:  1. Please take omeprazole 40 mg once a day.   2. Please take Tylenol solution 650 mg every 6 hours for mild to moderate pain control.   3. You have also been prescribed oxycodone solution for severe pain. Please take 5 mg every six hours for severe pain.     Warning Signs:  Please call your doctor if you experience the following:  *You experience new chest pain, pressure, squeezing or tightness.  *New or worsening cough, shortness of breath, or wheeze.  *If you are vomiting and cannot keep down fluids or your medications.  *You are getting dehydrated due to continued vomiting, diarrhea, or other reasons. Signs of dehydration include dry mouth, rapid heartbeat, or feeling dizzy or faint when standing.  *You see blood or dark/black material when you vomit or have a bowel movement.  *You experience burning when you urinate, have blood in your urine, or experience a discharge.  *Your pain is not improving within 8-12 hours or is not gone within 24 hours. Call or return immediately if your pain is getting worse, changes location, or moves to your chest or back.  *You have shaking chills, or fever greater than 101.5 degrees Fahrenheit or 38 degrees Celsius.  *Any change in your symptoms, or any new symptoms that concern you.   Please follow up with Dr. Lozano at your scheduled appointment on 3/7/24. Please call [phone] for any further questions. You may shower; soap and water over incision sites. Do not scrub. Pat dry when done. No tub bathing or swimming until cleared. Keep incision sites out of the sun as scars will darken. No heavy lifting (>10lbs) or strenuous exercise. Diet: Bariatric Clear Fluids. 60 grams protein daily.  64 fluid ounces water daily. Drink small sips throughout the day. Continue diet as outlined by paperwork received as a pre-operative patient. You should be urinating at least 3-4x per day. Call the office if you experience increasing abdominal pain, nausea, vomiting, or temperature >100.4F.      New Medications:  1. Please take omeprazole 40 mg once a day.   2. Please take Tylenol solution 650 mg every 6 hours for mild to moderate pain control.   3. You have also been prescribed oxycodone solution for severe pain. Please take 5 mg every six hours for severe pain.  4. You have been prescribed zofran & hyoscamine for nausea. Please take as prescribed.     Warning Signs:  Please call your doctor if you experience the following:  *You experience new chest pain, pressure, squeezing or tightness.  *New or worsening cough, shortness of breath, or wheeze.  *If you are vomiting and cannot keep down fluids or your medications.  *You are getting dehydrated due to continued vomiting, diarrhea, or other reasons. Signs of dehydration include dry mouth, rapid heartbeat, or feeling dizzy or faint when standing.  *You see blood or dark/black material when you vomit or have a bowel movement.  *You experience burning when you urinate, have blood in your urine, or experience a discharge.  *Your pain is not improving within 8-12 hours or is not gone within 24 hours. Call or return immediately if your pain is getting worse, changes location, or moves to your chest or back.  *You have shaking chills, or fever greater than 101.5 degrees Fahrenheit or 38 degrees Celsius.  *Any change in your symptoms, or any new symptoms that concern you.

## 2024-02-22 NOTE — DIETITIAN INITIAL EVALUATION ADULT - PERTINENT MEDS FT
MEDICATIONS  (STANDING):  acetaminophen   Oral Liquid .. 1000 milliGRAM(s) Oral every 6 hours  lactated ringers. 1000 milliLiter(s) (140 mL/Hr) IV Continuous <Continuous>  pantoprazole  Injectable 40 milliGRAM(s) IV Push daily  thiamine IVPB 500 milliGRAM(s) IV Intermittent every 24 hours    MEDICATIONS  (PRN):  hyoscyamine SL 0.125 milliGRAM(s) SubLingual every 6 hours PRN nausea  ondansetron Injectable 4 milliGRAM(s) IV Push every 6 hours PRN Nausea and/or Vomiting  oxyCODONE    Solution 10 milliGRAM(s) Oral every 6 hours PRN Severe Pain (7 - 10)  oxyCODONE    Solution 5 milliGRAM(s) Oral every 6 hours PRN Moderate Pain (4 - 6)

## 2024-02-22 NOTE — DIETITIAN INITIAL EVALUATION ADULT - WEIGHT USED FOR CALCULATIONS
Dr. Mas is the attending/following provider for patients [facility].         Sera Babcock, Jj Zepeda MD; [facility] Nurse Msg Pool  Hello    For hospice patient Librado Pérez can we please increase Librado Pérez's Lorazepam to 1-2mg every 2 hours as needed, the smaller dose is no longer effective. Can we please have the liquid form of Lorazepam as well as Mucinex ordered to [facility] pharmacy    Thank you  KESHA Zamora        Please see the above.   Pharmacy - [facility]       
Noted.   
Prescriptions for Ativan solution with the updated dose AND the Mucinex sent to the pharmacy requested.  
IBW

## 2024-02-22 NOTE — DIETITIAN INITIAL EVALUATION ADULT - OTHER INFO
28F pmhx of obesity and pshx of abdominoplasty now s/p RA lap VSG and HH repair with mesh (2/21)    Pt seen on 9Uris at bedside. On assessment, pt resting in bed. Currently on Bariatric Clear Liquids (BARICLLIQ) diet, tolerating PO. Pt had sips of water out of the clear, 30cc cups. Pain and nausea noted, pt was nauseous this morning, nurse and medical team are aware, pt is receiving medical management for this. Discussed volumes of various cup sizes on tray table and encouraged aiming for 4 oz/hr as tolerated. Prepared with protein shakes, with plan to get vitamins after discharge. RD provided in-depth education on diet advancement and specific nutrient needs status-post sleeve gastrectomy. No known food allergies. No dietary restrictions at home. Skin: surgical incisions. GI: WDL per flowsheet. Labs reviewed: elevated serum Glucose (118); will monitor trends. Pt's wt on admission was 224 pounds, pt's ideal body weight is 115 pounds, pt is 195% of ideal body weight; ideal body weight to be utilized for nutrient calculations. RD observed pt with no overt signs of muscle or fat wasting. Based on ASPEN guidelines, pt does not meet criteria for malnutrition at this time. RD to continue to follow up.

## 2024-02-22 NOTE — DIETITIAN INITIAL EVALUATION ADULT - ADD RECOMMEND
1. Bariatric Clear Liquids diet  2. Recommend advance to phase 1 bariatric full liquid diet when medically feasible  3. Encourage adequate hydration with goal of 4oz/hr and/or 64 oz/day  4. Monitor BMP, BG, POCT, electrolytes, replete prn

## 2024-02-22 NOTE — DISCHARGE NOTE PROVIDER - HOSPITAL COURSE
28 year old female with past medical history of obesity and past surgical history of abdominoplasty presented to [facility] for elective robot-assisted laparoscopic vertical sleeve gastrectomy and hiatal hernia repair with mesh (2/21) 28 year old female with past medical history of obesity and past surgical history of abdominoplasty presented to [facility] for elective robot-assisted laparoscopic vertical sleeve gastrectomy and hiatal hernia repair with mesh on 2/21/24. Procedure went uncomplicated. Her postoperative course was unremarkable with advancement of diet, passing trial of void, and pain control. On day of discharge patient was stable to be discharged home.   28 year old female with past medical history of obesity and past surgical history of abdominoplasty presented to [facility] for elective robot-assisted laparoscopic vertical sleeve gastrectomy and hiatal hernia repair with mesh on 2/21/24. Procedure went uncomplicated. On POD1 Hg was noted to be 8.1 from 11.1 the evening before. Vital signs remained stable and toradol and lovenox were held. On POD2 Am Hg was 6.2, patient remained hemodynamically normal and 2 units of packed red blood cells were administered. ______The remainder of her postoperative course was unremarkable with advancement of diet, passing trial of void, and pain control. On day of discharge patient was stable to be discharged home.   28 year old female with past medical history of obesity and past surgical history of abdominoplasty presented to [facility] for elective robot-assisted laparoscopic vertical sleeve gastrectomy and hiatal hernia repair with mesh on 2/21/24. Procedure went uncomplicated. On POD1 Hg was noted to be 8.1 from 11.1 the evening before. Vital signs remained stable and toradol and lovenox were held. On POD2 Am Hg was 6.2, patient remained hemodynamically normal and 2 units of packed red blood cells were administered. Hgb increased to 8.2. On Am of discharge Hgb was up to 8.7, hemodynamically stable. The remainder of her postoperative course was unremarkable with advancement of diet, passing trial of void, and pain control. On day of discharge patient was stable to be discharged home.

## 2024-02-22 NOTE — DIETITIAN INITIAL EVALUATION ADULT - PERTINENT LABORATORY DATA
02-22    143  |  106  |  16  ----------------------------<  118<H>  4.7   |  27  |  1.11    Ca    8.7      22 Feb 2024 05:30  Phos  4.1     02-22  Mg     1.9     02-22

## 2024-02-22 NOTE — DIETITIAN INITIAL EVALUATION ADULT - ETIOLOGY
related to need for educational review on the diet advancement process and specific nutrient needs status post surgery

## 2024-02-22 NOTE — DISCHARGE NOTE PROVIDER - NSDCMRMEDTOKEN_GEN_ALL_CORE_FT
acetaminophen 160 mg/5 mL oral suspension: 20 milliliter(s) orally every 6 hours as needed for  mild pain  hyoscyamine 0.125 mg sublingual tablet: 1 tab(s) sublingually 4 times a day as needed for  nausea  omeprazole 40 mg oral delayed release capsule: 1 cap(s) orally once a day  ondansetron 4 mg oral tablet, disintegratin tab(s) orally every 6 hours  oxyCODONE 5 mg/5 mL oral solution: 5 milliliter(s) orally every 6 hours as needed for  severe pain MDD: 20 mL

## 2024-02-22 NOTE — DISCHARGE NOTE PROVIDER - NSDCFUSCHEDAPPT_GEN_ALL_CORE_FT
Raymundo Lozano  [facility]  [facility] [place] [address]  Scheduled Appointment: 03/07/2024

## 2024-02-22 NOTE — DISCHARGE NOTE PROVIDER - NSDCCPTREATMENT_GEN_ALL_CORE_FT
PRINCIPAL PROCEDURE  Procedure: Robot-assisted sleeve gastrectomy using da Yasmin Xi with repair of hiatal hernia  Findings and Treatment:

## 2024-02-23 LAB
ANION GAP SERPL CALC-SCNC: 9 MMOL/L — SIGNIFICANT CHANGE UP (ref 5–17)
BLD GP AB SCN SERPL QL: NEGATIVE — SIGNIFICANT CHANGE UP
BUN SERPL-MCNC: 10 MG/DL — SIGNIFICANT CHANGE UP (ref 7–23)
CALCIUM SERPL-MCNC: 8.4 MG/DL — SIGNIFICANT CHANGE UP (ref 8.4–10.5)
CHLORIDE SERPL-SCNC: 106 MMOL/L — SIGNIFICANT CHANGE UP (ref 96–108)
CO2 SERPL-SCNC: 27 MMOL/L — SIGNIFICANT CHANGE UP (ref 22–31)
CREAT SERPL-MCNC: 0.82 MG/DL — SIGNIFICANT CHANGE UP (ref 0.5–1.3)
EGFR: 99 ML/MIN/1.73M2 — SIGNIFICANT CHANGE UP
GLUCOSE SERPL-MCNC: 87 MG/DL — SIGNIFICANT CHANGE UP (ref 70–99)
HCT VFR BLD CALC: 18.5 % — CRITICAL LOW (ref 34.5–45)
HCT VFR BLD CALC: 24.2 % — LOW (ref 34.5–45)
HGB BLD-MCNC: 6.2 G/DL — CRITICAL LOW (ref 11.5–15.5)
HGB BLD-MCNC: 8.2 G/DL — LOW (ref 11.5–15.5)
MAGNESIUM SERPL-MCNC: 1.9 MG/DL — SIGNIFICANT CHANGE UP (ref 1.6–2.6)
MCHC RBC-ENTMCNC: 28.4 PG — SIGNIFICANT CHANGE UP (ref 27–34)
MCHC RBC-ENTMCNC: 28.4 PG — SIGNIFICANT CHANGE UP (ref 27–34)
MCHC RBC-ENTMCNC: 33.5 GM/DL — SIGNIFICANT CHANGE UP (ref 32–36)
MCHC RBC-ENTMCNC: 33.9 GM/DL — SIGNIFICANT CHANGE UP (ref 32–36)
MCV RBC AUTO: 83.7 FL — SIGNIFICANT CHANGE UP (ref 80–100)
MCV RBC AUTO: 84.9 FL — SIGNIFICANT CHANGE UP (ref 80–100)
NRBC # BLD: 0 /100 WBCS — SIGNIFICANT CHANGE UP (ref 0–0)
NRBC # BLD: 0 /100 WBCS — SIGNIFICANT CHANGE UP (ref 0–0)
PHOSPHATE SERPL-MCNC: 2.4 MG/DL — LOW (ref 2.5–4.5)
PLATELET # BLD AUTO: 162 K/UL — SIGNIFICANT CHANGE UP (ref 150–400)
PLATELET # BLD AUTO: 165 K/UL — SIGNIFICANT CHANGE UP (ref 150–400)
POTASSIUM SERPL-MCNC: 3.9 MMOL/L — SIGNIFICANT CHANGE UP (ref 3.5–5.3)
POTASSIUM SERPL-SCNC: 3.9 MMOL/L — SIGNIFICANT CHANGE UP (ref 3.5–5.3)
RBC # BLD: 2.18 M/UL — LOW (ref 3.8–5.2)
RBC # BLD: 2.89 M/UL — LOW (ref 3.8–5.2)
RBC # FLD: 13.1 % — SIGNIFICANT CHANGE UP (ref 10.3–14.5)
RBC # FLD: 13.2 % — SIGNIFICANT CHANGE UP (ref 10.3–14.5)
RH IG SCN BLD-IMP: POSITIVE — SIGNIFICANT CHANGE UP
SODIUM SERPL-SCNC: 142 MMOL/L — SIGNIFICANT CHANGE UP (ref 135–145)
WBC # BLD: 13.04 K/UL — HIGH (ref 3.8–10.5)
WBC # BLD: 14.52 K/UL — HIGH (ref 3.8–10.5)
WBC # FLD AUTO: 13.04 K/UL — HIGH (ref 3.8–10.5)
WBC # FLD AUTO: 14.52 K/UL — HIGH (ref 3.8–10.5)

## 2024-02-23 RX ORDER — OXYCODONE HYDROCHLORIDE 5 MG/1
5 TABLET ORAL
Qty: 100 | Refills: 0
Start: 2024-02-23 | End: 2024-02-27

## 2024-02-23 RX ORDER — HYOSCYAMINE SULFATE 0.13 MG
1 TABLET ORAL
Qty: 28 | Refills: 0
Start: 2024-02-23 | End: 2024-02-29

## 2024-02-23 RX ORDER — SODIUM CHLORIDE 9 MG/ML
1000 INJECTION, SOLUTION INTRAVENOUS
Refills: 0 | Status: DISCONTINUED | OUTPATIENT
Start: 2024-02-23 | End: 2024-02-24

## 2024-02-23 RX ORDER — ACETAMINOPHEN 500 MG
1000 TABLET ORAL ONCE
Refills: 0 | Status: COMPLETED | OUTPATIENT
Start: 2024-02-23 | End: 2024-02-23

## 2024-02-23 RX ORDER — ONDANSETRON 8 MG/1
1 TABLET, FILM COATED ORAL
Qty: 2 | Refills: 0
Start: 2024-02-23

## 2024-02-23 RX ORDER — ACETAMINOPHEN 500 MG
20 TABLET ORAL
Qty: 560 | Refills: 0
Start: 2024-02-23 | End: 2024-02-29

## 2024-02-23 RX ORDER — LIDOCAINE 4 G/100G
1 CREAM TOPICAL ONCE
Refills: 0 | Status: COMPLETED | OUTPATIENT
Start: 2024-02-23 | End: 2024-02-23

## 2024-02-23 RX ORDER — METOCLOPRAMIDE HCL 10 MG
10 TABLET ORAL ONCE
Refills: 0 | Status: COMPLETED | OUTPATIENT
Start: 2024-02-23 | End: 2024-02-23

## 2024-02-23 RX ORDER — OMEPRAZOLE 10 MG/1
1 CAPSULE, DELAYED RELEASE ORAL
Qty: 30 | Refills: 0
Start: 2024-02-23 | End: 2024-03-23

## 2024-02-23 RX ADMIN — ONDANSETRON 4 MILLIGRAM(S): 8 TABLET, FILM COATED ORAL at 15:29

## 2024-02-23 RX ADMIN — Medication 400 MILLIGRAM(S): at 12:43

## 2024-02-23 RX ADMIN — LIDOCAINE 1 PATCH: 4 CREAM TOPICAL at 20:57

## 2024-02-23 RX ADMIN — SCOPALAMINE 1 PATCH: 1 PATCH, EXTENDED RELEASE TRANSDERMAL at 06:06

## 2024-02-23 RX ADMIN — ONDANSETRON 4 MILLIGRAM(S): 8 TABLET, FILM COATED ORAL at 01:25

## 2024-02-23 RX ADMIN — LIDOCAINE 1 PATCH: 4 CREAM TOPICAL at 19:21

## 2024-02-23 RX ADMIN — Medication 1000 MILLIGRAM(S): at 07:38

## 2024-02-23 RX ADMIN — Medication 0.12 MILLIGRAM(S): at 17:44

## 2024-02-23 RX ADMIN — Medication 10 MILLIGRAM(S): at 17:44

## 2024-02-23 RX ADMIN — ONDANSETRON 4 MILLIGRAM(S): 8 TABLET, FILM COATED ORAL at 21:02

## 2024-02-23 RX ADMIN — Medication 1000 MILLIGRAM(S): at 12:58

## 2024-02-23 RX ADMIN — PANTOPRAZOLE SODIUM 40 MILLIGRAM(S): 20 TABLET, DELAYED RELEASE ORAL at 12:43

## 2024-02-23 RX ADMIN — Medication 400 MILLIGRAM(S): at 07:21

## 2024-02-23 RX ADMIN — LIDOCAINE 1 PATCH: 4 CREAM TOPICAL at 08:25

## 2024-02-24 ENCOUNTER — TRANSCRIPTION ENCOUNTER (OUTPATIENT)
Age: 29
End: 2024-02-24

## 2024-02-24 VITALS
DIASTOLIC BLOOD PRESSURE: 79 MMHG | TEMPERATURE: 98 F | RESPIRATION RATE: 17 BRPM | OXYGEN SATURATION: 97 % | SYSTOLIC BLOOD PRESSURE: 115 MMHG

## 2024-02-24 LAB
ANION GAP SERPL CALC-SCNC: 12 MMOL/L — SIGNIFICANT CHANGE UP (ref 5–17)
BUN SERPL-MCNC: 7 MG/DL — SIGNIFICANT CHANGE UP (ref 7–23)
CALCIUM SERPL-MCNC: 8.7 MG/DL — SIGNIFICANT CHANGE UP (ref 8.4–10.5)
CHLORIDE SERPL-SCNC: 106 MMOL/L — SIGNIFICANT CHANGE UP (ref 96–108)
CO2 SERPL-SCNC: 26 MMOL/L — SIGNIFICANT CHANGE UP (ref 22–31)
CREAT SERPL-MCNC: 0.68 MG/DL — SIGNIFICANT CHANGE UP (ref 0.5–1.3)
EGFR: 121 ML/MIN/1.73M2 — SIGNIFICANT CHANGE UP
GLUCOSE SERPL-MCNC: 78 MG/DL — SIGNIFICANT CHANGE UP (ref 70–99)
HCT VFR BLD CALC: 25.3 % — LOW (ref 34.5–45)
HGB BLD-MCNC: 8.5 G/DL — LOW (ref 11.5–15.5)
MAGNESIUM SERPL-MCNC: 1.8 MG/DL — SIGNIFICANT CHANGE UP (ref 1.6–2.6)
MCHC RBC-ENTMCNC: 28.1 PG — SIGNIFICANT CHANGE UP (ref 27–34)
MCHC RBC-ENTMCNC: 33.6 GM/DL — SIGNIFICANT CHANGE UP (ref 32–36)
MCV RBC AUTO: 83.8 FL — SIGNIFICANT CHANGE UP (ref 80–100)
NRBC # BLD: 0 /100 WBCS — SIGNIFICANT CHANGE UP (ref 0–0)
PHOSPHATE SERPL-MCNC: 2.7 MG/DL — SIGNIFICANT CHANGE UP (ref 2.5–4.5)
PLATELET # BLD AUTO: 174 K/UL — SIGNIFICANT CHANGE UP (ref 150–400)
POTASSIUM SERPL-MCNC: 3.8 MMOL/L — SIGNIFICANT CHANGE UP (ref 3.5–5.3)
POTASSIUM SERPL-SCNC: 3.8 MMOL/L — SIGNIFICANT CHANGE UP (ref 3.5–5.3)
RBC # BLD: 3.02 M/UL — LOW (ref 3.8–5.2)
RBC # FLD: 13.2 % — SIGNIFICANT CHANGE UP (ref 10.3–14.5)
SODIUM SERPL-SCNC: 144 MMOL/L — SIGNIFICANT CHANGE UP (ref 135–145)
WBC # BLD: 13.41 K/UL — HIGH (ref 3.8–10.5)
WBC # FLD AUTO: 13.41 K/UL — HIGH (ref 3.8–10.5)

## 2024-02-24 PROCEDURE — C1781: CPT

## 2024-02-24 PROCEDURE — C1889: CPT

## 2024-02-24 PROCEDURE — 85025 COMPLETE CBC W/AUTO DIFF WBC: CPT

## 2024-02-24 PROCEDURE — 36430 TRANSFUSION BLD/BLD COMPNT: CPT

## 2024-02-24 PROCEDURE — 85027 COMPLETE CBC AUTOMATED: CPT

## 2024-02-24 PROCEDURE — 86901 BLOOD TYPING SEROLOGIC RH(D): CPT

## 2024-02-24 PROCEDURE — 88307 TISSUE EXAM BY PATHOLOGIST: CPT

## 2024-02-24 PROCEDURE — 80048 BASIC METABOLIC PNL TOTAL CA: CPT

## 2024-02-24 PROCEDURE — 83735 ASSAY OF MAGNESIUM: CPT

## 2024-02-24 PROCEDURE — S2900: CPT

## 2024-02-24 PROCEDURE — 86850 RBC ANTIBODY SCREEN: CPT

## 2024-02-24 PROCEDURE — 84100 ASSAY OF PHOSPHORUS: CPT

## 2024-02-24 PROCEDURE — 36415 COLL VENOUS BLD VENIPUNCTURE: CPT

## 2024-02-24 PROCEDURE — P9016: CPT

## 2024-02-24 PROCEDURE — 86900 BLOOD TYPING SEROLOGIC ABO: CPT

## 2024-02-24 PROCEDURE — 86923 COMPATIBILITY TEST ELECTRIC: CPT

## 2024-02-24 RX ADMIN — Medication 0.12 MILLIGRAM(S): at 00:34

## 2024-02-24 RX ADMIN — SCOPALAMINE 1 PATCH: 1 PATCH, EXTENDED RELEASE TRANSDERMAL at 06:15

## 2024-02-24 RX ADMIN — Medication 0.12 MILLIGRAM(S): at 06:46

## 2024-02-24 NOTE — DISCHARGE NOTE NURSING/CASE MANAGEMENT/SOCIAL WORK - PATIENT PORTAL LINK FT
You can access the FollowMyHealth Patient Portal offered by [facility] by registering at the following website: [email or web address]. By joining [employer]’s FollowMyHealth portal, you will also be able to view your health information using other applications (apps) compatible with our system.

## 2024-02-24 NOTE — DISCHARGE NOTE NURSING/CASE MANAGEMENT/SOCIAL WORK - NSDCPEFALRISK_GEN_ALL_CORE
For information on Fall & Injury Prevention, visit: [email or web address] OR  [email or web address] OR  https://www.cdc.gov/steadi/patient.html

## 2024-02-24 NOTE — PROGRESS NOTE ADULT - SUBJECTIVE AND OBJECTIVE BOX
Team 2 Surgery Post-Op Note, PCN:     Pre-Op Dx: Morbid obesity  Procedure: Gastrectomy, sleeve, HHR    Surgeon: Dr. Lozano    Subjective: Patient examined bedside resting comfortably without complaints. Tolerating ice chips. Denies HA, NV, CP, SOB.     Vital Signs Last 24 Hrs  T(C): 36 (21 Feb 2024 15:07), Max: 37.3 (21 Feb 2024 13:22)  T(F): 96.8 (21 Feb 2024 15:07), Max: 99.2 (21 Feb 2024 13:22)  HR: 83 (21 Feb 2024 15:37) (61 - 83)  BP: 123/82 (21 Feb 2024 15:37) (120/82 - 155/91)  BP(mean): 97 (21 Feb 2024 15:37) (97 - 118)  RR: 16 (21 Feb 2024 15:37) (12 - 17)  SpO2: 100% (21 Feb 2024 15:37) (97% - 100%)    Parameters below as of 21 Feb 2024 15:37  Patient On (Oxygen Delivery Method): nasal cannula  O2 Flow (L/min): 3      Physical Exam:  General: NAD, resting comfortably in bed  Pulmonary: Nonlabored breathing, no respiratory distress  Cardiovascular: NSR  Abdominal: soft, NT/ND, obese  Extremities: WWP, normal strength  Neuro: A/O x 3, CNs II-XII grossly intact, no focal deficits, normal motor/sensation  Pulses: palpable distal pulses    CAPILLARY BLOOD GLUCOSE            ABO Interpretation: B (02-21 @ 10:07)        Radiology and Additional Studies:    A&P    28F pmhx of obesity and pshx of abdominoplasty now s/p RA lap VSG and HH repair with mesh (2/21)    BCLD/IVF  Cefotetan x 24 hrs (gastric spillage)   pain/nausea control  OOBA/SCD/IS  Lovenox POD1  AM labs  Nutrition consult  
STATUS POST:  VSG & HHR      POST OPERATIVE DAY #: 1    SUBJECTIVE: Patient was seen and examined by chief resident. Patient c/o moderate nausea and spit up with clear liquids. Ambulating & voiding appropriately.      Vital Signs Last 24 Hrs  T(C): 36.4 (22 Feb 2024 05:00), Max: 37.3 (21 Feb 2024 13:22)  T(F): 97.6 (22 Feb 2024 05:00), Max: 99.2 (21 Feb 2024 13:22)  HR: 105 (22 Feb 2024 05:00) (61 - 105)  BP: 118/88 (22 Feb 2024 05:00) (118/84 - 155/91)  BP(mean): 97 (21 Feb 2024 15:37) (97 - 118)  RR: 18 (22 Feb 2024 05:00) (12 - 18)  SpO2: 99% (22 Feb 2024 05:00) (97% - 100%)    Parameters below as of 22 Feb 2024 05:00  Patient On (Oxygen Delivery Method): room air        I&O's Summary    21 Feb 2024 07:01  -  22 Feb 2024 07:00  --------------------------------------------------------  IN: 1800 mL / OUT: 850 mL / NET: 950 mL        Physical Exam:  General Appearance: Appears well, NAD  Pulmonary: Nonlabored breathing, no respiratory distress  Cardiovascular: NSR  Abdomen: Soft, ND, appropriate incisional tenderness, incisions CDI  Extremities: WWP, SCD's in place     LABS:                        11.1   26.91 )-----------( 304      ( 21 Feb 2024 20:56 )             33.5               
Patient evaluated with chief resident during AM rounds    STATUS POST:  Robot-assisted sleeve gastrectomy using da Yasmin Xi with repair of hiatal hernia    Gastrectomy, sleeve    Robot-assisted repair of hiatal hernia      POST OPERATIVE DAY #: 3    Overnight Events: Improved nausea. Hgb 8.2 at 8pm cbc  SUBJECTIVE: Comfortable. nausea improving. TOlerating CLD. Ambulatory. Pain well controlled.     Denies Chest Pain, Difficulty breathing, Calf Pain, Headache, Dizziness    OBJECTIVE:  Vital Signs Last 24 Hrs  T(C): 37.1 (24 Feb 2024 05:06), Max: 37.2 (23 Feb 2024 18:10)  T(F): 98.7 (24 Feb 2024 05:06), Max: 99 (23 Feb 2024 18:10)  HR: 79 (24 Feb 2024 05:06) (79 - 98)  BP: 137/86 (24 Feb 2024 05:06) (117/77 - 141/91)  BP(mean): --  RR: 18 (24 Feb 2024 05:06) (16 - 18)  SpO2: 98% (24 Feb 2024 05:06) (97% - 98%)    Parameters below as of 24 Feb 2024 05:06  Patient On (Oxygen Delivery Method): room air        I&O's Summary    23 Feb 2024 07:01  -  24 Feb 2024 07:00  --------------------------------------------------------  IN: 910 mL / OUT: 1700 mL / NET: -790 mL        Physical Exam:  General Appearance: Appears well, NAD  Pulmonary: Nonlabored breathing, no respiratory distress  Cardiovascular: NSR  Abdomen: Soft, obese, appropriate incisional tenderness, dressings clean and dry and intact  Extremities: WWP, SCD's in place     LABS:                        8.5    13.41 )-----------( 174      ( 24 Feb 2024 05:30 )             25.3     02-24    144  |  106  |  7   ----------------------------<  78  3.8   |  26  |  0.68    Ca    8.7      24 Feb 2024 05:30  Phos  2.7     02-24  Mg     1.8     02-24        Urinalysis Basic - ( 24 Feb 2024 05:30 )    Color: x / Appearance: x / SG: x / pH: x  Gluc: 78 mg/dL / Ketone: x  / Bili: x / Urobili: x   Blood: x / Protein: x / Nitrite: x   Leuk Esterase: x / RBC: x / WBC x   Sq Epi: x / Non Sq Epi: x / Bacteria: x      
STATUS POST: VSH & HHR      POST OPERATIVE DAY #: 2    SUBJECTIVE: Patient was seen and examined by chief resident. Patient resting comfortably in bed with no acute complaints. Improved PO intake compared to yesterday. Nausea also improving.       Vital Signs Last 24 Hrs  T(C): 37 (23 Feb 2024 05:15), Max: 37.4 (22 Feb 2024 17:26)  T(F): 98.6 (23 Feb 2024 05:15), Max: 99.4 (22 Feb 2024 17:26)  HR: 85 (23 Feb 2024 05:15) (85 - 112)  BP: 127/81 (23 Feb 2024 05:15) (99/64 - 137/91)  BP(mean): 94 (22 Feb 2024 17:26) (94 - 107)  RR: 17 (23 Feb 2024 05:15) (16 - 18)  SpO2: 98% (23 Feb 2024 05:15) (96% - 99%)    Parameters below as of 23 Feb 2024 05:15  Patient On (Oxygen Delivery Method): room air        I&O's Summary    22 Feb 2024 07:01  -  23 Feb 2024 07:00  --------------------------------------------------------  IN: 1330 mL / OUT: 1550 mL / NET: -220 mL        Physical Exam:  General Appearance: Appears well, NAD  Pulmonary: Nonlabored breathing, no respiratory distress  Cardiovascular: NSR  Abdomen: Soft, ND, appropriate incisional tenderness, incisions CDI  Extremities: WWP, SCD's in place     LABS:                        8.1    20.19 )-----------( 220      ( 22 Feb 2024 11:06 )             24.2     02-22    143  |  106  |  16  ----------------------------<  118<H>  4.7   |  27  |  1.11    Ca    8.7      22 Feb 2024 05:30  Phos  4.1     02-22  Mg     1.9     02-22        Urinalysis Basic - ( 22 Feb 2024 05:30 )    Color: x / Appearance: x / SG: x / pH: x  Gluc: 118 mg/dL / Ketone: x  / Bili: x / Urobili: x   Blood: x / Protein: x / Nitrite: x   Leuk Esterase: x / RBC: x / WBC x   Sq Epi: x / Non Sq Epi: x / Bacteria: x

## 2024-02-26 ENCOUNTER — NON-APPOINTMENT (OUTPATIENT)
Age: 29
End: 2024-02-26

## 2024-02-26 LAB — SURGICAL PATHOLOGY STUDY: SIGNIFICANT CHANGE UP

## 2024-03-01 DIAGNOSIS — E66.01 MORBID (SEVERE) OBESITY DUE TO EXCESS CALORIES: ICD-10-CM

## 2024-03-01 DIAGNOSIS — D62 ACUTE POSTHEMORRHAGIC ANEMIA: ICD-10-CM

## 2024-03-01 DIAGNOSIS — K44.9 DIAPHRAGMATIC HERNIA WITHOUT OBSTRUCTION OR GANGRENE: ICD-10-CM

## 2024-03-07 ENCOUNTER — APPOINTMENT (OUTPATIENT)
Dept: BARIATRICS | Facility: CLINIC | Age: 29
End: 2024-03-07
Payer: MEDICAID

## 2024-03-07 VITALS
DIASTOLIC BLOOD PRESSURE: 88 MMHG | SYSTOLIC BLOOD PRESSURE: 127 MMHG | BODY MASS INDEX: 38.46 KG/M2 | TEMPERATURE: 97.4 F | HEART RATE: 68 BPM | OXYGEN SATURATION: 98 % | HEIGHT: 62 IN | WEIGHT: 209 LBS

## 2024-03-07 PROCEDURE — 99024 POSTOP FOLLOW-UP VISIT: CPT

## 2024-03-07 NOTE — END OF VISIT
[Time Spent: ___ minutes] : I have spent [unfilled] minutes of time on the encounter. [FreeTextEntry3] :  Documented by Amaury Hodges acting as a scribe for RICHIE Yost  on 03/07/2024  .

## 2024-03-07 NOTE — ASSESSMENT
[FreeTextEntry1] : Pt is a 29 y/o F 2 weeks s/p VSG with HH repair with MESH on 02/21/2024 who presents today for a follow up. Her BMI today is 38 and she has lost 19 lbs since sx. She is doing really well and denies n/v/c/d/ SOB, acid reflux, po intolerance, chest pain, abd pain, dizziness, fever and calf pain. Patient is currently on Omeprazole to prevent acid reflux and mentions fatigue shortly after taking it. She also has also been anemic following surgery, so we educated her that several factors could be playing a role in her fatigue such as anemia, dehydration, and low-calorie intake early post-op, but can discontinue Omeprazole if she desires. Patient has also encouraged to increase fluid intake and maintain a fluid intake of at least 64 fluid oz. She has been compliant with vitamins, and we've advised her to limit physical activity to 30 minutes while her incisions are healing. On physical examination, incisions are healing with no sign of bleeding, oozing, and infection. She has no other concern at this time, will meet the nutritionist today, and follow up in 2 weeks.

## 2024-03-07 NOTE — HISTORY OF PRESENT ILLNESS
[de-identified] : Pt is a 27 y/o F 2 weeks s/p VSG with HH repair with MESH on 02/21/2024 who presents today for a follow up. Her BMI today is 38 and she has lost 19 lbs since sx. She is doing really well and denies n/v/c/d/ SOB, acid reflux, po intolerance, chest pain, abd pain, dizziness, fever and calf pain. Patient is currently on Omeprazole to prevent acid reflux and mentions fatigue shortly after taking it. She also has also been anemic following surgery, so we educated her that several factors could be playing a role in her fatigue such as anemia, dehydration, and low-calorie intake early post-op, but can discontinue Omeprazole if she desires. Patient has also encouraged to increase fluid intake and maintain a fluid intake of at least 64 fluid oz. She has been compliant with vitamins, and we've advised her to limit physical activity to 30 minutes while her incisions are healing. On physical examination, incisions are healing with no sign of bleeding, oozing, and infection. She has no other concern at this time, will meet the nutritionist today, and follow up in 2 weeks.

## 2024-03-07 NOTE — PHYSICAL EXAM
[Obese] : obese [Normal] : no peripheral adenopathy appreciated [de-identified] : incisions are healing with no sign of bleeding, oozing, and infection.

## 2024-03-16 ENCOUNTER — INPATIENT (INPATIENT)
Facility: HOSPITAL | Age: 29
LOS: 1 days | Discharge: HOME | DRG: 921 | End: 2024-03-18
Attending: SURGERY | Admitting: SURGERY
Payer: COMMERCIAL

## 2024-03-16 VITALS
OXYGEN SATURATION: 99 % | HEART RATE: 75 BPM | RESPIRATION RATE: 17 BRPM | SYSTOLIC BLOOD PRESSURE: 118 MMHG | DIASTOLIC BLOOD PRESSURE: 83 MMHG | TEMPERATURE: 98 F

## 2024-03-16 DIAGNOSIS — Z98.890 OTHER SPECIFIED POSTPROCEDURAL STATES: Chronic | ICD-10-CM

## 2024-03-16 DIAGNOSIS — Z98.84 BARIATRIC SURGERY STATUS: Chronic | ICD-10-CM

## 2024-03-16 LAB
ANION GAP SERPL CALC-SCNC: 10 MMOL/L — SIGNIFICANT CHANGE UP (ref 5–17)
APTT BLD: 29.9 SEC — SIGNIFICANT CHANGE UP (ref 24.5–35.6)
BASOPHILS # BLD AUTO: 0.05 K/UL — SIGNIFICANT CHANGE UP (ref 0–0.2)
BASOPHILS NFR BLD AUTO: 0.6 % — SIGNIFICANT CHANGE UP (ref 0–2)
BLD GP AB SCN SERPL QL: NEGATIVE — SIGNIFICANT CHANGE UP
BUN SERPL-MCNC: 8 MG/DL — SIGNIFICANT CHANGE UP (ref 7–23)
CALCIUM SERPL-MCNC: 9.1 MG/DL — SIGNIFICANT CHANGE UP (ref 8.4–10.5)
CHLORIDE SERPL-SCNC: 106 MMOL/L — SIGNIFICANT CHANGE UP (ref 96–108)
CO2 SERPL-SCNC: 23 MMOL/L — SIGNIFICANT CHANGE UP (ref 22–31)
CREAT SERPL-MCNC: 0.72 MG/DL — SIGNIFICANT CHANGE UP (ref 0.5–1.3)
EGFR: 116 ML/MIN/1.73M2 — SIGNIFICANT CHANGE UP
EOSINOPHIL # BLD AUTO: 0.7 K/UL — HIGH (ref 0–0.5)
EOSINOPHIL NFR BLD AUTO: 9 % — HIGH (ref 0–6)
GLUCOSE SERPL-MCNC: 82 MG/DL — SIGNIFICANT CHANGE UP (ref 70–99)
HCG UR QL: NEGATIVE — SIGNIFICANT CHANGE UP
HCT VFR BLD CALC: 34.4 % — LOW (ref 34.5–45)
HGB BLD-MCNC: 11.4 G/DL — LOW (ref 11.5–15.5)
IMM GRANULOCYTES NFR BLD AUTO: 0.1 % — SIGNIFICANT CHANGE UP (ref 0–0.9)
INR BLD: 1.16 — SIGNIFICANT CHANGE UP (ref 0.85–1.18)
LYMPHOCYTES # BLD AUTO: 2.35 K/UL — SIGNIFICANT CHANGE UP (ref 1–3.3)
LYMPHOCYTES # BLD AUTO: 30.1 % — SIGNIFICANT CHANGE UP (ref 13–44)
MAGNESIUM SERPL-MCNC: 1.8 MG/DL — SIGNIFICANT CHANGE UP (ref 1.6–2.6)
MCHC RBC-ENTMCNC: 28 PG — SIGNIFICANT CHANGE UP (ref 27–34)
MCHC RBC-ENTMCNC: 33.1 GM/DL — SIGNIFICANT CHANGE UP (ref 32–36)
MCV RBC AUTO: 84.5 FL — SIGNIFICANT CHANGE UP (ref 80–100)
MONOCYTES # BLD AUTO: 0.64 K/UL — SIGNIFICANT CHANGE UP (ref 0–0.9)
MONOCYTES NFR BLD AUTO: 8.2 % — SIGNIFICANT CHANGE UP (ref 2–14)
NEUTROPHILS # BLD AUTO: 4.05 K/UL — SIGNIFICANT CHANGE UP (ref 1.8–7.4)
NEUTROPHILS NFR BLD AUTO: 52 % — SIGNIFICANT CHANGE UP (ref 43–77)
NRBC # BLD: 0 /100 WBCS — SIGNIFICANT CHANGE UP (ref 0–0)
PHOSPHATE SERPL-MCNC: 4.2 MG/DL — SIGNIFICANT CHANGE UP (ref 2.5–4.5)
PLATELET # BLD AUTO: 228 K/UL — SIGNIFICANT CHANGE UP (ref 150–400)
POTASSIUM SERPL-MCNC: 3.6 MMOL/L — SIGNIFICANT CHANGE UP (ref 3.5–5.3)
POTASSIUM SERPL-SCNC: 3.6 MMOL/L — SIGNIFICANT CHANGE UP (ref 3.5–5.3)
PROTHROM AB SERPL-ACNC: 13.2 SEC — HIGH (ref 9.5–13)
RBC # BLD: 4.07 M/UL — SIGNIFICANT CHANGE UP (ref 3.8–5.2)
RBC # FLD: 14.2 % — SIGNIFICANT CHANGE UP (ref 10.3–14.5)
RH IG SCN BLD-IMP: POSITIVE — SIGNIFICANT CHANGE UP
SODIUM SERPL-SCNC: 139 MMOL/L — SIGNIFICANT CHANGE UP (ref 135–145)
WBC # BLD: 7.8 K/UL — SIGNIFICANT CHANGE UP (ref 3.8–10.5)
WBC # FLD AUTO: 7.8 K/UL — SIGNIFICANT CHANGE UP (ref 3.8–10.5)

## 2024-03-16 PROCEDURE — 10160 PNXR ASPIR ABSC HMTMA BULLA: CPT

## 2024-03-16 PROCEDURE — 77012 CT SCAN FOR NEEDLE BIOPSY: CPT | Mod: 26

## 2024-03-16 RX ORDER — ACETAMINOPHEN 500 MG
1000 TABLET ORAL ONCE
Refills: 0 | Status: COMPLETED | OUTPATIENT
Start: 2024-03-16 | End: 2024-03-16

## 2024-03-16 RX ORDER — FLUCONAZOLE 150 MG/1
400 TABLET ORAL EVERY 24 HOURS
Refills: 0 | Status: DISCONTINUED | OUTPATIENT
Start: 2024-03-17 | End: 2024-03-18

## 2024-03-16 RX ORDER — PIPERACILLIN AND TAZOBACTAM 4; .5 G/20ML; G/20ML
3.38 INJECTION, POWDER, LYOPHILIZED, FOR SOLUTION INTRAVENOUS EVERY 8 HOURS
Refills: 0 | Status: DISCONTINUED | OUTPATIENT
Start: 2024-03-16 | End: 2024-03-18

## 2024-03-16 RX ORDER — HYDROMORPHONE HYDROCHLORIDE 2 MG/ML
0.2 INJECTION INTRAMUSCULAR; INTRAVENOUS; SUBCUTANEOUS ONCE
Refills: 0 | Status: DISCONTINUED | OUTPATIENT
Start: 2024-03-16 | End: 2024-03-16

## 2024-03-16 RX ORDER — SODIUM CHLORIDE 9 MG/ML
1000 INJECTION, SOLUTION INTRAVENOUS
Refills: 0 | Status: DISCONTINUED | OUTPATIENT
Start: 2024-03-16 | End: 2024-03-18

## 2024-03-16 RX ORDER — PANTOPRAZOLE SODIUM 20 MG/1
40 TABLET, DELAYED RELEASE ORAL ONCE
Refills: 0 | Status: COMPLETED | OUTPATIENT
Start: 2024-03-16 | End: 2024-03-16

## 2024-03-16 RX ORDER — FLUCONAZOLE 150 MG/1
800 TABLET ORAL ONCE
Refills: 0 | Status: COMPLETED | OUTPATIENT
Start: 2024-03-16 | End: 2024-03-16

## 2024-03-16 RX ORDER — INFLUENZA VIRUS VACCINE 15; 15; 15; 15 UG/.5ML; UG/.5ML; UG/.5ML; UG/.5ML
0.5 SUSPENSION INTRAMUSCULAR ONCE
Refills: 0 | Status: DISCONTINUED | OUTPATIENT
Start: 2024-03-16 | End: 2024-03-18

## 2024-03-16 RX ORDER — PIPERACILLIN AND TAZOBACTAM 4; .5 G/20ML; G/20ML
3.38 INJECTION, POWDER, LYOPHILIZED, FOR SOLUTION INTRAVENOUS ONCE
Refills: 0 | Status: COMPLETED | OUTPATIENT
Start: 2024-03-16 | End: 2024-03-16

## 2024-03-16 RX ORDER — THIAMINE MONONITRATE (VIT B1) 100 MG
500 TABLET ORAL DAILY
Refills: 0 | Status: DISCONTINUED | OUTPATIENT
Start: 2024-03-16 | End: 2024-03-18

## 2024-03-16 RX ORDER — ONDANSETRON 8 MG/1
4 TABLET, FILM COATED ORAL EVERY 6 HOURS
Refills: 0 | Status: DISCONTINUED | OUTPATIENT
Start: 2024-03-16 | End: 2024-03-18

## 2024-03-16 RX ADMIN — PIPERACILLIN AND TAZOBACTAM 25 GRAM(S): 4; .5 INJECTION, POWDER, LYOPHILIZED, FOR SOLUTION INTRAVENOUS at 09:29

## 2024-03-16 RX ADMIN — PIPERACILLIN AND TAZOBACTAM 25 GRAM(S): 4; .5 INJECTION, POWDER, LYOPHILIZED, FOR SOLUTION INTRAVENOUS at 21:16

## 2024-03-16 RX ADMIN — HYDROMORPHONE HYDROCHLORIDE 0.2 MILLIGRAM(S): 2 INJECTION INTRAMUSCULAR; INTRAVENOUS; SUBCUTANEOUS at 21:32

## 2024-03-16 RX ADMIN — FLUCONAZOLE 100 MILLIGRAM(S): 150 TABLET ORAL at 05:59

## 2024-03-16 RX ADMIN — PANTOPRAZOLE SODIUM 40 MILLIGRAM(S): 20 TABLET, DELAYED RELEASE ORAL at 13:36

## 2024-03-16 RX ADMIN — PIPERACILLIN AND TAZOBACTAM 25 GRAM(S): 4; .5 INJECTION, POWDER, LYOPHILIZED, FOR SOLUTION INTRAVENOUS at 15:01

## 2024-03-16 RX ADMIN — PIPERACILLIN AND TAZOBACTAM 200 GRAM(S): 4; .5 INJECTION, POWDER, LYOPHILIZED, FOR SOLUTION INTRAVENOUS at 05:59

## 2024-03-16 RX ADMIN — HYDROMORPHONE HYDROCHLORIDE 0.2 MILLIGRAM(S): 2 INJECTION INTRAMUSCULAR; INTRAVENOUS; SUBCUTANEOUS at 21:16

## 2024-03-16 NOTE — H&P ADULT - HISTORY OF PRESENT ILLNESS
28y F patient with no PMHx and PSHx of VSG & HHR with mesh on 2/21 at [facility]. Patient was doing well post-operatively and was meeting all post-operative goals when returned to followup outpatient appointment on 3/7. Since that appointment, patient states she has had sporadic L shoulder pain associated with meals and walking. Otherwise patient had no post-op complaints and was tolerating a pureed bariatric diet. Patient presented to OSH yesterday with 1d hx of nausea, emesis x3, and diarrhea. Per OSH records, patient's labs were unremarkable with WBC 8 and Hgb 13. CXR within normal limits however CT A/P was obtained with findings of a 6.1 x 6.6 x 8.0cm heterogenous complex fluid collection left lateral to gastric sleeve staple line. Patient was transferred to [facility] surgery for further management of concern of gastric sleeve leak.

## 2024-03-16 NOTE — PROGRESS NOTE ADULT - SUBJECTIVE AND OBJECTIVE BOX
SUBJECTIVE:      MEDICATIONS  (STANDING):  influenza   Vaccine 0.5 milliLiter(s) IntraMuscular once  lactated ringers. 1000 milliLiter(s) (130 mL/Hr) IV Continuous <Continuous>  piperacillin/tazobactam IVPB.- 3.375 Gram(s) IV Intermittent once  piperacillin/tazobactam IVPB.- 3.375 Gram(s) IV Intermittent once  piperacillin/tazobactam IVPB.. 3.375 Gram(s) IV Intermittent every 8 hours    MEDICATIONS  (PRN):  ondansetron Injectable 4 milliGRAM(s) IV Push every 6 hours PRN Nausea      Vital Signs Last 24 Hrs  T(C): 36.9 (16 Mar 2024 04:42), Max: 36.9 (16 Mar 2024 04:42)  T(F): 98.4 (16 Mar 2024 04:42), Max: 98.4 (16 Mar 2024 04:42)  HR: 75 (16 Mar 2024 04:42) (75 - 75)  BP: 118/83 (16 Mar 2024 04:42) (118/83 - 118/83)  BP(mean): --  RR: 17 (16 Mar 2024 04:42) (17 - 17)  SpO2: 99% (16 Mar 2024 04:42) (99% - 99%)    Parameters below as of 16 Mar 2024 04:42  Patient On (Oxygen Delivery Method): room air        Physical Exam:  General: NAD, resting comfortably in bed  Pulmonary: Nonlabored breathing, no respiratory distress  Cardiovascular: NSR  Abdominal: soft, NT/ND  Extremities: WWP, normal strength  Neuro: A/O x 3, CNs II-XII grossly intact, no focal deficits    I&O's Summary      LABS:                        11.4   7.80  )-----------( 228      ( 16 Mar 2024 05:15 )             34.4     03-16    139  |  106  |  8   ----------------------------<  82  3.6   |  23  |  0.72    Ca    9.1      16 Mar 2024 05:15  Phos  4.2     03-16  Mg     1.8     03-16      PT/INR - ( 16 Mar 2024 05:15 )   PT: 13.2 sec;   INR: 1.16          PTT - ( 16 Mar 2024 05:15 )  PTT:29.9 sec  Urinalysis Basic - ( 16 Mar 2024 05:15 )    Color: x / Appearance: x / SG: x / pH: x  Gluc: 82 mg/dL / Ketone: x  / Bili: x / Urobili: x   Blood: x / Protein: x / Nitrite: x   Leuk Esterase: x / RBC: x / WBC x   Sq Epi: x / Non Sq Epi: x / Bacteria: x      CAPILLARY BLOOD GLUCOSE            RADIOLOGY & ADDITIONAL STUDIES:   SUBJECTIVE:  Pt seen and examined at bedside. Pt endorses feeling well, denies nausea/vomiting since coming to ED/hospital. Endorses passing flatus.     MEDICATIONS  (STANDING):  influenza   Vaccine 0.5 milliLiter(s) IntraMuscular once  lactated ringers. 1000 milliLiter(s) (130 mL/Hr) IV Continuous <Continuous>  piperacillin/tazobactam IVPB.- 3.375 Gram(s) IV Intermittent once  piperacillin/tazobactam IVPB.- 3.375 Gram(s) IV Intermittent once  piperacillin/tazobactam IVPB.. 3.375 Gram(s) IV Intermittent every 8 hours    MEDICATIONS  (PRN):  ondansetron Injectable 4 milliGRAM(s) IV Push every 6 hours PRN Nausea      Vital Signs Last 24 Hrs  T(C): 36.9 (16 Mar 2024 04:42), Max: 36.9 (16 Mar 2024 04:42)  T(F): 98.4 (16 Mar 2024 04:42), Max: 98.4 (16 Mar 2024 04:42)  HR: 75 (16 Mar 2024 04:42) (75 - 75)  BP: 118/83 (16 Mar 2024 04:42) (118/83 - 118/83)  BP(mean): --  RR: 17 (16 Mar 2024 04:42) (17 - 17)  SpO2: 99% (16 Mar 2024 04:42) (99% - 99%)    Parameters below as of 16 Mar 2024 04:42  Patient On (Oxygen Delivery Method): room air        Physical Exam:  General: NAD, resting comfortably in bed  Pulmonary: Nonlabored breathing, no respiratory distress  Cardiovascular: NSR  Abdominal: soft, NT/ND  Extremities: WWP, normal strength  Neuro: A/O x 3, CNs II-XII grossly intact, no focal deficits    I&O's Summary      LABS:                        11.4   7.80  )-----------( 228      ( 16 Mar 2024 05:15 )             34.4     03-16    139  |  106  |  8   ----------------------------<  82  3.6   |  23  |  0.72    Ca    9.1      16 Mar 2024 05:15  Phos  4.2     03-16  Mg     1.8     03-16      PT/INR - ( 16 Mar 2024 05:15 )   PT: 13.2 sec;   INR: 1.16          PTT - ( 16 Mar 2024 05:15 )  PTT:29.9 sec  Urinalysis Basic - ( 16 Mar 2024 05:15 )    Color: x / Appearance: x / SG: x / pH: x  Gluc: 82 mg/dL / Ketone: x  / Bili: x / Urobili: x   Blood: x / Protein: x / Nitrite: x   Leuk Esterase: x / RBC: x / WBC x   Sq Epi: x / Non Sq Epi: x / Bacteria: x      CAPILLARY BLOOD GLUCOSE            RADIOLOGY & ADDITIONAL STUDIES:

## 2024-03-16 NOTE — H&P ADULT - ASSESSMENT
28y F patient with no PMHx and PSHx of VSG & HHR with mesh on 2/21 at [facility]. Patient presented to OSH yesterday with 2wk hx of L shoulder pain associated with meals and 1d hx of nausea, emesis x3, and diarrhea.  CT A/P was obtained with findings of a 6.1 x 6.6 x 8.0cm heterogenous complex fluid collection left lateral to gastric sleeve staple line. Patient was transferred to [facility] surgery for further management of concern of gastric sleeve leak.    NPO/IVF  zosyn/fluc  pain and nausea  SCD/OOB  AM labs

## 2024-03-16 NOTE — PROGRESS NOTE ADULT - ASSESSMENT
28y F patient with no PMHx and PSHx of VSG & HHR with mesh on 2/21 at [facility]. Patient presented to OSH yesterday with 2wk hx of L shoulder pain associated with meals and 1d hx of nausea, emesis x3, and diarrhea.  CT A/P was obtained with findings of a 6.1 x 6.6 x 8.0cm heterogenous complex fluid collection left lateral to gastric sleeve staple line. Patient was transferred to [facility] surgery for further management of concern of gastric sleeve leak.    IR consult for drainage of derian-VSG hematoma vs collection   NPO/IVF  zosyn/fluc  pain and nausea  SCD/OOB  AM labs

## 2024-03-16 NOTE — H&P ADULT - NSHPPHYSICALEXAM_GEN_ALL_CORE
CONSTITUTIONAL: Well groomed, no apparent distress  RESP: No respiratory distress, no use of accessory muscles  CV: RRR, no JVD; no peripheral edema  GI: Soft, NT, ND, no rebound, no guarding; no palpable masses; no hepatosplenomegaly; no hernia palpated; previously surgical incisions well healed  NEURO: CN II-XII intact; sensation intact in upper and lower extremities b/l to light touch   PSYCH: Appropriate insight/judgment; A+O x 3, mood and affect appropriate, recent/remote memory intact

## 2024-03-16 NOTE — H&P ADULT - NSICDXPASTSURGICALHX_GEN_ALL_CORE_FT
PAST SURGICAL HISTORY:  History of abdominoplasty     History of repair of hiatal hernia     S/P laparoscopic sleeve gastrectomy

## 2024-03-17 LAB
ANION GAP SERPL CALC-SCNC: 11 MMOL/L — SIGNIFICANT CHANGE UP (ref 5–17)
BUN SERPL-MCNC: 7 MG/DL — SIGNIFICANT CHANGE UP (ref 7–23)
CALCIUM SERPL-MCNC: 8.7 MG/DL — SIGNIFICANT CHANGE UP (ref 8.4–10.5)
CHLORIDE SERPL-SCNC: 107 MMOL/L — SIGNIFICANT CHANGE UP (ref 96–108)
CO2 SERPL-SCNC: 23 MMOL/L — SIGNIFICANT CHANGE UP (ref 22–31)
CREAT SERPL-MCNC: 0.71 MG/DL — SIGNIFICANT CHANGE UP (ref 0.5–1.3)
EGFR: 118 ML/MIN/1.73M2 — SIGNIFICANT CHANGE UP
GLUCOSE SERPL-MCNC: 72 MG/DL — SIGNIFICANT CHANGE UP (ref 70–99)
GRAM STN FLD: SIGNIFICANT CHANGE UP
HCT VFR BLD CALC: 33.9 % — LOW (ref 34.5–45)
HGB BLD-MCNC: 11.2 G/DL — LOW (ref 11.5–15.5)
MAGNESIUM SERPL-MCNC: 1.8 MG/DL — SIGNIFICANT CHANGE UP (ref 1.6–2.6)
MCHC RBC-ENTMCNC: 27.7 PG — SIGNIFICANT CHANGE UP (ref 27–34)
MCHC RBC-ENTMCNC: 33 GM/DL — SIGNIFICANT CHANGE UP (ref 32–36)
MCV RBC AUTO: 83.7 FL — SIGNIFICANT CHANGE UP (ref 80–100)
NRBC # BLD: 0 /100 WBCS — SIGNIFICANT CHANGE UP (ref 0–0)
PHOSPHATE SERPL-MCNC: 5.1 MG/DL — HIGH (ref 2.5–4.5)
PLATELET # BLD AUTO: 228 K/UL — SIGNIFICANT CHANGE UP (ref 150–400)
POTASSIUM SERPL-MCNC: 3.5 MMOL/L — SIGNIFICANT CHANGE UP (ref 3.5–5.3)
POTASSIUM SERPL-SCNC: 3.5 MMOL/L — SIGNIFICANT CHANGE UP (ref 3.5–5.3)
RBC # BLD: 4.05 M/UL — SIGNIFICANT CHANGE UP (ref 3.8–5.2)
RBC # FLD: 14.5 % — SIGNIFICANT CHANGE UP (ref 10.3–14.5)
SODIUM SERPL-SCNC: 141 MMOL/L — SIGNIFICANT CHANGE UP (ref 135–145)
SPECIMEN SOURCE: SIGNIFICANT CHANGE UP
WBC # BLD: 7.63 K/UL — SIGNIFICANT CHANGE UP (ref 3.8–10.5)
WBC # FLD AUTO: 7.63 K/UL — SIGNIFICANT CHANGE UP (ref 3.8–10.5)

## 2024-03-17 PROCEDURE — 74178 CT ABD&PLV WO CNTR FLWD CNTR: CPT | Mod: 26

## 2024-03-17 RX ORDER — MAGNESIUM SULFATE 500 MG/ML
1 VIAL (ML) INJECTION ONCE
Refills: 0 | Status: COMPLETED | OUTPATIENT
Start: 2024-03-17 | End: 2024-03-17

## 2024-03-17 RX ORDER — LIDOCAINE 4 G/100G
1 CREAM TOPICAL ONCE
Refills: 0 | Status: COMPLETED | OUTPATIENT
Start: 2024-03-17 | End: 2024-03-17

## 2024-03-17 RX ORDER — POTASSIUM CHLORIDE 20 MEQ
10 PACKET (EA) ORAL
Refills: 0 | Status: DISCONTINUED | OUTPATIENT
Start: 2024-03-17 | End: 2024-03-17

## 2024-03-17 RX ORDER — ACETAMINOPHEN 500 MG
1000 TABLET ORAL ONCE
Refills: 0 | Status: COMPLETED | OUTPATIENT
Start: 2024-03-17 | End: 2024-03-17

## 2024-03-17 RX ORDER — IOHEXOL 300 MG/ML
30 INJECTION, SOLUTION INTRAVENOUS ONCE
Refills: 0 | Status: COMPLETED | OUTPATIENT
Start: 2024-03-17 | End: 2024-03-17

## 2024-03-17 RX ORDER — POTASSIUM CHLORIDE 20 MEQ
40 PACKET (EA) ORAL ONCE
Refills: 0 | Status: COMPLETED | OUTPATIENT
Start: 2024-03-17 | End: 2024-03-17

## 2024-03-17 RX ADMIN — Medication 105 MILLIGRAM(S): at 12:28

## 2024-03-17 RX ADMIN — Medication 105 MILLIGRAM(S): at 01:33

## 2024-03-17 RX ADMIN — Medication 100 MILLIEQUIVALENT(S): at 10:44

## 2024-03-17 RX ADMIN — Medication 1000 MILLIGRAM(S): at 06:47

## 2024-03-17 RX ADMIN — Medication 100 GRAM(S): at 13:33

## 2024-03-17 RX ADMIN — LIDOCAINE 1 PATCH: 4 CREAM TOPICAL at 10:44

## 2024-03-17 RX ADMIN — Medication 40 MILLIEQUIVALENT(S): at 13:33

## 2024-03-17 RX ADMIN — Medication 400 MILLIGRAM(S): at 06:23

## 2024-03-17 RX ADMIN — SODIUM CHLORIDE 130 MILLILITER(S): 9 INJECTION, SOLUTION INTRAVENOUS at 13:33

## 2024-03-17 RX ADMIN — PIPERACILLIN AND TAZOBACTAM 25 GRAM(S): 4; .5 INJECTION, POWDER, LYOPHILIZED, FOR SOLUTION INTRAVENOUS at 15:40

## 2024-03-17 RX ADMIN — Medication 1000 MILLIGRAM(S): at 01:45

## 2024-03-17 RX ADMIN — FLUCONAZOLE 100 MILLIGRAM(S): 150 TABLET ORAL at 04:50

## 2024-03-17 RX ADMIN — IOHEXOL 30 MILLILITER(S): 300 INJECTION, SOLUTION INTRAVENOUS at 07:54

## 2024-03-17 RX ADMIN — Medication 400 MILLIGRAM(S): at 01:35

## 2024-03-17 RX ADMIN — LIDOCAINE 1 PATCH: 4 CREAM TOPICAL at 19:24

## 2024-03-17 RX ADMIN — PIPERACILLIN AND TAZOBACTAM 25 GRAM(S): 4; .5 INJECTION, POWDER, LYOPHILIZED, FOR SOLUTION INTRAVENOUS at 23:13

## 2024-03-17 RX ADMIN — PIPERACILLIN AND TAZOBACTAM 25 GRAM(S): 4; .5 INJECTION, POWDER, LYOPHILIZED, FOR SOLUTION INTRAVENOUS at 07:50

## 2024-03-17 RX ADMIN — Medication 100 MILLIEQUIVALENT(S): at 12:28

## 2024-03-17 NOTE — PROGRESS NOTE ADULT - ASSESSMENT
28y F patient with no PMHx and PSHx of VSG & HHR with mesh on 2/21 at [facility]. Patient presented to OSH yesterday with 2wk hx of L shoulder pain associated with meals and 1d hx of nausea, emesis x3, and diarrhea.  CT A/P was obtained with findings of a 6.1 x 6.6 x 8.0cm heterogenous complex fluid collection left lateral to gastric sleeve staple line. Patient was transferred to [facility] surgery for further management of concern of gastric sleeve leak.    NPO/IVF  zosyn/fluc  pain and nausea  SCD/OOB  AM labs   28y F patient with no PMHx and PSHx of VSG & HHR with mesh on 2/21 at [facility]. Patient presented to OSH yesterday with 2wk hx of L shoulder pain associated with meals and 1d hx of nausea, emesis x3, and diarrhea.  CT A/P was obtained with findings of a 6.1 x 6.6 x 8.0cm heterogenous complex fluid collection left lateral to gastric sleeve staple line. Patient was transferred to [facility] surgery for further management of concern of gastric sleeve leak.. Now s/p IR drainage (3/16)    NPO/IVF  zosyn/fluc  pain and nausea  SCD/OOB  AM labs  CT abd and pel with oral contrast

## 2024-03-17 NOTE — PROGRESS NOTE ADULT - SUBJECTIVE AND OBJECTIVE BOX
Overnight events:       POD#    SUBJECTIVE:      MEDICATIONS  (STANDING):  acetaminophen   IVPB .. 1000 milliGRAM(s) IV Intermittent once  fluconAZOLE IVPB 400 milliGRAM(s) IV Intermittent every 24 hours  influenza   Vaccine 0.5 milliLiter(s) IntraMuscular once  lactated ringers. 1000 milliLiter(s) (130 mL/Hr) IV Continuous <Continuous>  piperacillin/tazobactam IVPB.. 3.375 Gram(s) IV Intermittent every 8 hours  thiamine IVPB 500 milliGRAM(s) IV Intermittent daily    MEDICATIONS  (PRN):  ondansetron Injectable 4 milliGRAM(s) IV Push every 6 hours PRN Nausea      Vital Signs Last 24 Hrs  T(C): 36.7 (17 Mar 2024 04:44), Max: 37.1 (16 Mar 2024 16:58)  T(F): 98.1 (17 Mar 2024 04:44), Max: 98.7 (16 Mar 2024 16:58)  HR: 80 (17 Mar 2024 04:44) (70 - 80)  BP: 128/93 (17 Mar 2024 04:44) (117/86 - 135/85)  BP(mean): --  RR: 18 (17 Mar 2024 04:44) (18 - 19)  SpO2: 95% (17 Mar 2024 04:44) (95% - 100%)    Parameters below as of 17 Mar 2024 04:44  Patient On (Oxygen Delivery Method): room air        Physical Exam:  General: NAD, resting comfortably in bed  Pulmonary: Nonlabored breathing, no respiratory distress  Cardiovascular: NSR  Abdominal: soft, NT/ND  Extremities: WWP, normal strength  Neuro: A/O x 3, CNs II-XII grossly intact, no focal deficits, normal motor/sensation  Pulses: palpable distal pulses    I&O's Summary    16 Mar 2024 07:01  -  17 Mar 2024 06:07  --------------------------------------------------------  IN: 1950 mL / OUT: 300 mL / NET: 1650 mL        LABS:                        11.4   7.80  )-----------( 228      ( 16 Mar 2024 05:15 )             34.4     03-16    139  |  106  |  8   ----------------------------<  82  3.6   |  23  |  0.72    Ca    9.1      16 Mar 2024 05:15  Phos  4.2     03-16  Mg     1.8     03-16      PT/INR - ( 16 Mar 2024 05:15 )   PT: 13.2 sec;   INR: 1.16          PTT - ( 16 Mar 2024 05:15 )  PTT:29.9 sec  Urinalysis Basic - ( 16 Mar 2024 05:15 )    Color: x / Appearance: x / SG: x / pH: x  Gluc: 82 mg/dL / Ketone: x  / Bili: x / Urobili: x   Blood: x / Protein: x / Nitrite: x   Leuk Esterase: x / RBC: x / WBC x   Sq Epi: x / Non Sq Epi: x / Bacteria: x      CAPILLARY BLOOD GLUCOSE            RADIOLOGY & ADDITIONAL STUDIES:   Overnight events: No acute overnight events.       POD#1 IR drainage     SUBJECTIVE: Patient seen at bedside with chief resident. Patient denies nausea or vomiting. Reports pain along TONY drain.       MEDICATIONS  (STANDING):  acetaminophen   IVPB .. 1000 milliGRAM(s) IV Intermittent once  fluconAZOLE IVPB 400 milliGRAM(s) IV Intermittent every 24 hours  influenza   Vaccine 0.5 milliLiter(s) IntraMuscular once  lactated ringers. 1000 milliLiter(s) (130 mL/Hr) IV Continuous <Continuous>  piperacillin/tazobactam IVPB.. 3.375 Gram(s) IV Intermittent every 8 hours  thiamine IVPB 500 milliGRAM(s) IV Intermittent daily    MEDICATIONS  (PRN):  ondansetron Injectable 4 milliGRAM(s) IV Push every 6 hours PRN Nausea      Vital Signs Last 24 Hrs  T(C): 36.7 (17 Mar 2024 04:44), Max: 37.1 (16 Mar 2024 16:58)  T(F): 98.1 (17 Mar 2024 04:44), Max: 98.7 (16 Mar 2024 16:58)  HR: 80 (17 Mar 2024 04:44) (70 - 80)  BP: 128/93 (17 Mar 2024 04:44) (117/86 - 135/85)  BP(mean): --  RR: 18 (17 Mar 2024 04:44) (18 - 19)  SpO2: 95% (17 Mar 2024 04:44) (95% - 100%)    Parameters below as of 17 Mar 2024 04:44  Patient On (Oxygen Delivery Method): room air        Physical Exam:  General: NAD, resting comfortably in bed  Pulmonary: Nonlabored breathing, no respiratory distress  Cardiovascular: NSR  Abdominal: soft, ND, tender along TONY drain site, TONY dark sang  Extremities: WWP, normal strength  Neuro: A/O x 3, CNs II-XII grossly intact,     I&O's Summary    16 Mar 2024 07:01  -  17 Mar 2024 06:07  --------------------------------------------------------  IN: 1950 mL / OUT: 300 mL / NET: 1650 mL        LABS:                        11.4   7.80  )-----------( 228      ( 16 Mar 2024 05:15 )             34.4     03-16    139  |  106  |  8   ----------------------------<  82  3.6   |  23  |  0.72    Ca    9.1      16 Mar 2024 05:15  Phos  4.2     03-16  Mg     1.8     03-16      PT/INR - ( 16 Mar 2024 05:15 )   PT: 13.2 sec;   INR: 1.16          PTT - ( 16 Mar 2024 05:15 )  PTT:29.9 sec  Urinalysis Basic - ( 16 Mar 2024 05:15 )    Color: x / Appearance: x / SG: x / pH: x  Gluc: 82 mg/dL / Ketone: x  / Bili: x / Urobili: x   Blood: x / Protein: x / Nitrite: x   Leuk Esterase: x / RBC: x / WBC x   Sq Epi: x / Non Sq Epi: x / Bacteria: x      CAPILLARY BLOOD GLUCOSE            RADIOLOGY & ADDITIONAL STUDIES:

## 2024-03-18 ENCOUNTER — TRANSCRIPTION ENCOUNTER (OUTPATIENT)
Age: 29
End: 2024-03-18

## 2024-03-18 VITALS
TEMPERATURE: 98 F | SYSTOLIC BLOOD PRESSURE: 114 MMHG | RESPIRATION RATE: 18 BRPM | HEART RATE: 79 BPM | OXYGEN SATURATION: 100 % | DIASTOLIC BLOOD PRESSURE: 79 MMHG

## 2024-03-18 LAB
ANION GAP SERPL CALC-SCNC: 12 MMOL/L — SIGNIFICANT CHANGE UP (ref 5–17)
BUN SERPL-MCNC: 4 MG/DL — LOW (ref 7–23)
CALCIUM SERPL-MCNC: 8.9 MG/DL — SIGNIFICANT CHANGE UP (ref 8.4–10.5)
CHLORIDE SERPL-SCNC: 105 MMOL/L — SIGNIFICANT CHANGE UP (ref 96–108)
CO2 SERPL-SCNC: 24 MMOL/L — SIGNIFICANT CHANGE UP (ref 22–31)
CREAT SERPL-MCNC: 0.72 MG/DL — SIGNIFICANT CHANGE UP (ref 0.5–1.3)
EGFR: 116 ML/MIN/1.73M2 — SIGNIFICANT CHANGE UP
GLUCOSE SERPL-MCNC: 77 MG/DL — SIGNIFICANT CHANGE UP (ref 70–99)
HCT VFR BLD CALC: 33.5 % — LOW (ref 34.5–45)
HGB BLD-MCNC: 11.2 G/DL — LOW (ref 11.5–15.5)
MAGNESIUM SERPL-MCNC: 2.2 MG/DL — SIGNIFICANT CHANGE UP (ref 1.6–2.6)
MCHC RBC-ENTMCNC: 27.9 PG — SIGNIFICANT CHANGE UP (ref 27–34)
MCHC RBC-ENTMCNC: 33.4 GM/DL — SIGNIFICANT CHANGE UP (ref 32–36)
MCV RBC AUTO: 83.3 FL — SIGNIFICANT CHANGE UP (ref 80–100)
NRBC # BLD: 0 /100 WBCS — SIGNIFICANT CHANGE UP (ref 0–0)
PHOSPHATE SERPL-MCNC: 4 MG/DL — SIGNIFICANT CHANGE UP (ref 2.5–4.5)
PLATELET # BLD AUTO: 224 K/UL — SIGNIFICANT CHANGE UP (ref 150–400)
POTASSIUM SERPL-MCNC: 4 MMOL/L — SIGNIFICANT CHANGE UP (ref 3.5–5.3)
POTASSIUM SERPL-SCNC: 4 MMOL/L — SIGNIFICANT CHANGE UP (ref 3.5–5.3)
RBC # BLD: 4.02 M/UL — SIGNIFICANT CHANGE UP (ref 3.8–5.2)
RBC # FLD: 14.3 % — SIGNIFICANT CHANGE UP (ref 10.3–14.5)
SODIUM SERPL-SCNC: 141 MMOL/L — SIGNIFICANT CHANGE UP (ref 135–145)
WBC # BLD: 9.37 K/UL — SIGNIFICANT CHANGE UP (ref 3.8–10.5)
WBC # FLD AUTO: 9.37 K/UL — SIGNIFICANT CHANGE UP (ref 3.8–10.5)

## 2024-03-18 PROCEDURE — 87070 CULTURE OTHR SPECIMN AEROBIC: CPT

## 2024-03-18 PROCEDURE — 85027 COMPLETE CBC AUTOMATED: CPT

## 2024-03-18 PROCEDURE — 86901 BLOOD TYPING SEROLOGIC RH(D): CPT

## 2024-03-18 PROCEDURE — 81025 URINE PREGNANCY TEST: CPT

## 2024-03-18 PROCEDURE — 77012 CT SCAN FOR NEEDLE BIOPSY: CPT

## 2024-03-18 PROCEDURE — 10160 PNXR ASPIR ABSC HMTMA BULLA: CPT

## 2024-03-18 PROCEDURE — 85025 COMPLETE CBC W/AUTO DIFF WBC: CPT

## 2024-03-18 PROCEDURE — 80048 BASIC METABOLIC PNL TOTAL CA: CPT

## 2024-03-18 PROCEDURE — 83735 ASSAY OF MAGNESIUM: CPT

## 2024-03-18 PROCEDURE — C1729: CPT

## 2024-03-18 PROCEDURE — 87205 SMEAR GRAM STAIN: CPT

## 2024-03-18 PROCEDURE — 86850 RBC ANTIBODY SCREEN: CPT

## 2024-03-18 PROCEDURE — 74178 CT ABD&PLV WO CNTR FLWD CNTR: CPT | Mod: MC

## 2024-03-18 PROCEDURE — 86900 BLOOD TYPING SEROLOGIC ABO: CPT

## 2024-03-18 PROCEDURE — 85730 THROMBOPLASTIN TIME PARTIAL: CPT

## 2024-03-18 PROCEDURE — 84100 ASSAY OF PHOSPHORUS: CPT

## 2024-03-18 PROCEDURE — C1769: CPT

## 2024-03-18 PROCEDURE — 49406 IMAGE CATH FLUID PERI/RETRO: CPT

## 2024-03-18 PROCEDURE — 36415 COLL VENOUS BLD VENIPUNCTURE: CPT

## 2024-03-18 PROCEDURE — 85610 PROTHROMBIN TIME: CPT

## 2024-03-18 PROCEDURE — 87075 CULTR BACTERIA EXCEPT BLOOD: CPT

## 2024-03-18 RX ORDER — LIDOCAINE 4 G/100G
2 CREAM TOPICAL EVERY 24 HOURS
Refills: 0 | Status: DISCONTINUED | OUTPATIENT
Start: 2024-03-18 | End: 2024-03-18

## 2024-03-18 RX ORDER — ENOXAPARIN SODIUM 100 MG/ML
40 INJECTION SUBCUTANEOUS ONCE
Refills: 0 | Status: COMPLETED | OUTPATIENT
Start: 2024-03-18 | End: 2024-03-18

## 2024-03-18 RX ORDER — ACETAMINOPHEN 500 MG
1000 TABLET ORAL ONCE
Refills: 0 | Status: COMPLETED | OUTPATIENT
Start: 2024-03-18 | End: 2024-03-18

## 2024-03-18 RX ADMIN — FLUCONAZOLE 100 MILLIGRAM(S): 150 TABLET ORAL at 04:36

## 2024-03-18 RX ADMIN — LIDOCAINE 2 PATCH: 4 CREAM TOPICAL at 06:36

## 2024-03-18 RX ADMIN — Medication 1000 MILLIGRAM(S): at 12:21

## 2024-03-18 RX ADMIN — Medication 400 MILLIGRAM(S): at 03:25

## 2024-03-18 RX ADMIN — LIDOCAINE 2 PATCH: 4 CREAM TOPICAL at 05:44

## 2024-03-18 RX ADMIN — Medication 1000 MILLIGRAM(S): at 03:41

## 2024-03-18 RX ADMIN — ENOXAPARIN SODIUM 40 MILLIGRAM(S): 100 INJECTION SUBCUTANEOUS at 07:29

## 2024-03-18 RX ADMIN — PIPERACILLIN AND TAZOBACTAM 25 GRAM(S): 4; .5 INJECTION, POWDER, LYOPHILIZED, FOR SOLUTION INTRAVENOUS at 06:58

## 2024-03-18 NOTE — PROGRESS NOTE ADULT - ASSESSMENT
28y F patient with no PMHx and PSHx of VSG & HHR with mesh on 2/21 at [facility]. Patient presented to OSH yesterday with 2wk hx of L shoulder pain associated with meals and 1d hx of nausea, emesis x3, and diarrhea.  CT A/P was obtained with findings of a 6.1 x 6.6 x 8.0cm heterogenous complex fluid collection left lateral to gastric sleeve staple line. 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 Now s/p IR drainage (3/16)    BCLD/IVF  zosyn/fluc  pain and nausea  SCD/OOB  TONY x 1  AM labs   28y F patient with no PMHx and PSHx of VSG & HHR with mesh on 2/21 at [facility]. Patient presented to OSH yesterday with 2wk hx of L shoulder pain associated with meals and 1d hx of nausea, emesis x3, and diarrhea.  CT A/P was obtained with findings of a 6.1 x 6.6 x 8.0cm heterogenous complex fluid collection left lateral to gastric sleeve staple line. Patient was transferred to [facility] surgery for further management of concern of gastric sleeve leak. Now s/p IR drainage (3/16). Tolerating diet well. Pain localized to Incision site of TONY drain. TONY drain with output of 7.5 in last 24 hours with no change in color. Will access possible advancement of diet, IVHF, duration of drain and possible discharge timing.     BCLD/IVF  zosyn/fluc  pain and nausea  SCD/OOB  TONY x 1  AM labs    Plans to be discussed with attending and chief resident for finalization.

## 2024-03-18 NOTE — DISCHARGE NOTE PROVIDER - NSDCFUADDINST_GEN_ALL_CORE_FT
Please follow up with Dr. Lozano in the office on Thursday for drain removal.     TONY Drain Care:  *Please look at the site every day for signs of infection (increased redness or pain, swelling, odor, yellow or bloody discharge, warm to touch, fever).  *Maintain suction of the bulb.  *Note color, consistency, and amount of fluid in the drain. Call the doctor, nurse practitioner, or VNA nurse if the amount increases significantly or changes in character.  *Be sure to empty the drain frequently. Record the output.  *You may shower; wash the area gently with warm, soapy water.  *Keep the insertion site clean and dry otherwise.  *Avoid swimming, baths, hot tubs; do not submerge yourself in water.  *Make sure to keep the drain attached securely to your body to prevent pulling or dislocation.     General Discharge Instructions:  Please resume all regular home medications unless specifically advised not to take a particular medication. Also, please take any new medications as prescribed.  Please get plenty of rest, continue to ambulate several times per day, and drink adequate amounts of fluids. Avoid lifting weights greater than 5-10 lbs until you follow-up with your surgeon, who will instruct you further regarding activity restrictions.  Avoid driving or operating heavy machinery while taking pain medications.  Please follow-up with your surgeon and Primary Care Provider (PCP) as advised.  Incision Care:  *Please call your doctor or nurse practitioner if you have increased pain, swelling, redness, or drainage from the incision site.  *Avoid swimming and baths until your follow-up appointment.  *You may shower, and wash surgical incisions with a mild soap and warm water. Gently pat the area dry.    Warning Signs to monitor:  Please call your doctor or nurse practitioner if you experience the following:  *You experience new chest pain, pressure, squeezing or tightness.  *New or worsening cough, shortness of breath, or wheeze.  *If you are vomiting and cannot keep down fluids or your medications.  *You are getting dehydrated due to continued vomiting, diarrhea, or other reasons. Signs of dehydration include dry mouth, rapid heartbeat, or feeling dizzy or faint when standing.  *You see blood or dark/black material when you vomit or have a bowel movement.  *You experience burning when you urinate, have blood in your urine, or experience a discharge.  *Your pain is not improving within 8-12 hours or is not gone within 24 hours. Call or return immediately if your pain is getting worse, changes location, or moves to your chest or back.  *You have shaking chills, or fever greater than 101.5 degrees Fahrenheit or 38 degrees Celsius.  *Any change in your symptoms, or any new symptoms that concern you.    If unable to reach a medical professional please go to an emergency room.

## 2024-03-18 NOTE — DISCHARGE NOTE PROVIDER - NSDCFUSCHEDAPPT_GEN_ALL_CORE_FT
Raymundo Lozano  [facility]  [facility] [place] [address]  Scheduled Appointment: 03/21/2024

## 2024-03-18 NOTE — DISCHARGE NOTE PROVIDER - HOSPITAL COURSE
28y F patient with no PMHx and PSHx of VSG & HHR with mesh on 2/21 at [facility]. Patient presented to OSH yesterday with 2wk hx of L shoulder pain associated with meals and 1d hx of nausea, emesis x3, and diarrhea.  CT A/P was obtained with findings of a 6.1 x 6.6 x 8.0cm heterogenous complex fluid collection left lateral to gastric sleeve staple line. Patient was transferred to [facility] surgery for further management of concern of gastric sleeve leak. Now s/p IR drainage on 3/16 with 5cc sanguineous drained. 28y F patient with no PMHx and PSHx of VSG & HHR with mesh on 2/21 at [facility]. Patient presented to OSH yesterday with 2wk hx of L shoulder pain associated with meals and 1d hx of nausea, emesis x3, and diarrhea.  CT A/P was obtained with findings of a 6.1 x 6.6 x 8.0cm heterogenous complex fluid collection left lateral to gastric sleeve staple line. Patient was transferred to [facility] surgery for further management of concern of gastric sleeve leak. Now s/p IR drainage on 3/16 with 5cc sanguineous drained. Her postoperative course was unremarkable with advancement of diet, passing trial of void, and pain control. On day of discharge patient was stable to be d/c'd home.

## 2024-03-18 NOTE — DISCHARGE NOTE NURSING/CASE MANAGEMENT/SOCIAL WORK - NSDCPEFALRISK_GEN_ALL_CORE
For information on Fall & Injury Prevention, visit: [email or web address] OR  [email or web address] OR  https://www.cdc.gov/steadi/patient.html

## 2024-03-18 NOTE — DISCHARGE NOTE PROVIDER - CARE PROVIDER_API CALL
Raymundo Lozano  Surgery  [address] [place] [id number]  Phone: [phone]  Fax: [phone]  Scheduled Appointment: 03/21/2024

## 2024-03-18 NOTE — PROGRESS NOTE ADULT - SUBJECTIVE AND OBJECTIVE BOX
Overnight events:       POD#    SUBJECTIVE:      MEDICATIONS  (STANDING):  fluconAZOLE IVPB 400 milliGRAM(s) IV Intermittent every 24 hours  influenza   Vaccine 0.5 milliLiter(s) IntraMuscular once  lactated ringers. 1000 milliLiter(s) (60 mL/Hr) IV Continuous <Continuous>  lidocaine   4% Patch 2 Patch Transdermal every 24 hours  piperacillin/tazobactam IVPB.. 3.375 Gram(s) IV Intermittent every 8 hours  thiamine IVPB 500 milliGRAM(s) IV Intermittent daily    MEDICATIONS  (PRN):  ondansetron Injectable 4 milliGRAM(s) IV Push every 6 hours PRN Nausea      Vital Signs Last 24 Hrs  T(C): 36.9 (18 Mar 2024 04:02), Max: 37.1 (17 Mar 2024 06:29)  T(F): 98.4 (18 Mar 2024 04:02), Max: 98.7 (17 Mar 2024 06:29)  HR: 69 (18 Mar 2024 04:02) (69 - 82)  BP: 120/83 (18 Mar 2024 04:02) (120/83 - 133/86)  BP(mean): --  RR: 19 (18 Mar 2024 04:02) (16 - 19)  SpO2: 97% (18 Mar 2024 04:02) (97% - 100%)    Parameters below as of 18 Mar 2024 04:02  Patient On (Oxygen Delivery Method): room air        Physical Exam:  General: NAD, resting comfortably in bed  Pulmonary: Nonlabored breathing, no respiratory distress  Cardiovascular: NSR  Abdominal: soft, NT/ND  Extremities: WWP, normal strength  Neuro: A/O x 3, CNs II-XII grossly intact, no focal deficits, normal motor/sensation  Pulses: palpable distal pulses    I&O's Summary    16 Mar 2024 07:01  -  17 Mar 2024 07:00  --------------------------------------------------------  IN: 2600 mL / OUT: 600 mL / NET: 2000 mL    17 Mar 2024 07:01  -  18 Mar 2024 06:00  --------------------------------------------------------  IN: 240 mL / OUT: 732.5 mL / NET: -492.5 mL        LABS:                        11.2   7.63  )-----------( 228      ( 17 Mar 2024 05:30 )             33.9     03-17    141  |  107  |  7   ----------------------------<  72  3.5   |  23  |  0.71    Ca    8.7      17 Mar 2024 05:30  Phos  5.1     03-17  Mg     1.8     03-17        Urinalysis Basic - ( 17 Mar 2024 05:30 )    Color: x / Appearance: x / SG: x / pH: x  Gluc: 72 mg/dL / Ketone: x  / Bili: x / Urobili: x   Blood: x / Protein: x / Nitrite: x   Leuk Esterase: x / RBC: x / WBC x   Sq Epi: x / Non Sq Epi: x / Bacteria: x      CAPILLARY BLOOD GLUCOSE            RADIOLOGY & ADDITIONAL STUDIES:   Overnight events: No acute overnight events.       POD#1 IR drainage     SUBJECTIVE: Patient seen at bedside with chief resident. Patient denies nausea or vomiting. Reports she has had no issues drinking. Endorses ambulation. Patient reports pain along drain site      MEDICATIONS  (STANDING):  fluconAZOLE IVPB 400 milliGRAM(s) IV Intermittent every 24 hours  influenza   Vaccine 0.5 milliLiter(s) IntraMuscular once  lactated ringers. 1000 milliLiter(s) (60 mL/Hr) IV Continuous <Continuous>  lidocaine   4% Patch 2 Patch Transdermal every 24 hours  piperacillin/tazobactam IVPB.. 3.375 Gram(s) IV Intermittent every 8 hours  thiamine IVPB 500 milliGRAM(s) IV Intermittent daily    MEDICATIONS  (PRN):  ondansetron Injectable 4 milliGRAM(s) IV Push every 6 hours PRN Nausea      Vital Signs Last 24 Hrs  T(C): 36.9 (18 Mar 2024 04:02), Max: 37.1 (17 Mar 2024 06:29)  T(F): 98.4 (18 Mar 2024 04:02), Max: 98.7 (17 Mar 2024 06:29)  HR: 69 (18 Mar 2024 04:02) (69 - 82)  BP: 120/83 (18 Mar 2024 04:02) (120/83 - 133/86)  BP(mean): --  RR: 19 (18 Mar 2024 04:02) (16 - 19)  SpO2: 97% (18 Mar 2024 04:02) (97% - 100%)    Parameters below as of 18 Mar 2024 04:02  Patient On (Oxygen Delivery Method): room air        Physical Exam:  General: NAD, resting comfortably in bed  Pulmonary: Nonlabored breathing, no respiratory distress  Cardiovascular: NSR  Abdominal: soft, ND, minimally ttp along drain site, no signs of hematoma or infection, TONY drain dark sanguineous   Extremities: WWP, normal strength  Neuro: A/O x 3, CNs II-XII grossly intact,     I&O's Summary    16 Mar 2024 07:01  -  17 Mar 2024 07:00  --------------------------------------------------------  IN: 2600 mL / OUT: 600 mL / NET: 2000 mL    17 Mar 2024 07:01  -  18 Mar 2024 06:00  --------------------------------------------------------  IN: 240 mL / OUT: 732.5 mL / NET: -492.5 mL        LABS:                        11.2   7.63  )-----------( 228      ( 17 Mar 2024 05:30 )             33.9     03-17    141  |  107  |  7   ----------------------------<  72  3.5   |  23  |  0.71    Ca    8.7      17 Mar 2024 05:30  Phos  5.1     03-17  Mg     1.8     03-17        Urinalysis Basic - ( 17 Mar 2024 05:30 )    Color: x / Appearance: x / SG: x / pH: x  Gluc: 72 mg/dL / Ketone: x  / Bili: x / Urobili: x   Blood: x / Protein: x / Nitrite: x   Leuk Esterase: x / RBC: x / WBC x   Sq Epi: x / Non Sq Epi: x / Bacteria: x      CAPILLARY BLOOD GLUCOSE            RADIOLOGY & ADDITIONAL STUDIES:

## 2024-03-18 NOTE — DISCHARGE NOTE NURSING/CASE MANAGEMENT/SOCIAL WORK - PATIENT PORTAL LINK FT
You can access the FollowMyHealth Patient Portal offered by [facility] by registering at the following website: [email or web address]. By joining [employer]s FollowMyHealth portal, you will also be able to view your health information using other applications (apps) compatible with our system.

## 2024-03-21 ENCOUNTER — APPOINTMENT (OUTPATIENT)
Dept: BARIATRICS | Facility: CLINIC | Age: 29
End: 2024-03-21
Payer: MEDICAID

## 2024-03-21 VITALS
HEART RATE: 90 BPM | HEIGHT: 62 IN | TEMPERATURE: 97.2 F | WEIGHT: 204 LBS | OXYGEN SATURATION: 97 % | BODY MASS INDEX: 37.54 KG/M2 | DIASTOLIC BLOOD PRESSURE: 88 MMHG | SYSTOLIC BLOOD PRESSURE: 144 MMHG

## 2024-03-21 DIAGNOSIS — Z98.84 BARIATRIC SURGERY STATUS: ICD-10-CM

## 2024-03-21 PROBLEM — Z78.9 OTHER SPECIFIED HEALTH STATUS: Chronic | Status: ACTIVE | Noted: 2024-03-16

## 2024-03-21 LAB
CULTURE RESULTS: NO GROWTH — SIGNIFICANT CHANGE UP
SPECIMEN SOURCE: SIGNIFICANT CHANGE UP

## 2024-03-21 PROCEDURE — 99024 POSTOP FOLLOW-UP VISIT: CPT

## 2024-03-21 NOTE — ASSESSMENT
[FreeTextEntry1] : Pt is a 29 y/o F 1 month s/p VSG done on 03/21/2024 who presents today for a follow up. Her BMI is 34 and she has lost 24 lbs since sx. She states she went to the ER in [facility] in [place] due to consistent emesis and was informed that she developed a hematoma around the staple line of the VSG. She was then transferred to [facility] where she had a IR drain placed and reports that drainage is less than 5 cc of fluid every day. During her visit today, IR drain was removed without difficulty or any bleeding. Other than that, the patient is doing really well and denies n/v/c/d/ SOB, acid reflux, po intolerance, chest pain, abd pain, dizziness, fever and calf pain. She has no other concerns at this time. She will meet the nutritionist today and will follow up in 2 mo. Advised to cover drain site for next 24 hrs, supplies provided, then can undress site and shower daily normally. No other concerns at this time.

## 2024-03-21 NOTE — HISTORY OF PRESENT ILLNESS
[de-identified] : Pt is a 29 y/o F 1 month s/p VSG done on 03/21/2024 who presents today for a follow up. Her BMI is 34 and she has lost 24 lbs since sx. She states she went to the ER in [facility] in [place] due to consistent emesis and was informed that she developed a hematoma around the staple line of the VSG. She was then transferred to [facility] where she had a IR drain placed and reports that drainage is less than 5 cc of fluid every day. During her visit today, IR drain was removed without difficulty or any bleeding. Other than that, the patient is doing really well and denies n/v/c/d/ SOB, acid reflux, po intolerance, chest pain, abd pain, dizziness, fever and calf pain. She has no other concerns at this time. She will meet the nutritionist today and will follow up in 2 mo. Advised to cover drain site for next 24 hrs, supplies provided, then can undress site and shower daily normally. No other concerns at this time.

## 2024-03-21 NOTE — PHYSICAL EXAM
[Obese] : obese [Normal] : no peripheral adenopathy appreciated [de-identified] : drain site removed LUQ, no bleeding or evidence of infection, site without erythema or ttp

## 2024-03-21 NOTE — END OF VISIT
[Time Spent: ___ minutes] : I have spent [unfilled] minutes of time on the encounter. [FreeTextEntry3] :  All medical record entries made by the Scribe were at my, RICHIE Ibarra , direction and personally dictated by me on 03/21/2024 . I have reviewed the chart and agree that the record accurately reflects my personal performance of the history, physical exam, assessment and plan. I have also personally directed, reviewed, and agreed with the chart.

## 2024-03-22 DIAGNOSIS — R19.7 DIARRHEA, UNSPECIFIED: ICD-10-CM

## 2024-03-22 DIAGNOSIS — Z28.9 IMMUNIZATION NOT CARRIED OUT FOR UNSPECIFIED REASON: ICD-10-CM

## 2024-03-22 DIAGNOSIS — Z98.84 BARIATRIC SURGERY STATUS: ICD-10-CM
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
## 2024-03-22 DIAGNOSIS — M25.512 PAIN IN LEFT SHOULDER: ICD-10-CM

## 2024-03-22 DIAGNOSIS — K91.870 POSTPROCEDURAL HEMATOMA OF A DIGESTIVE SYSTEM ORGAN OR STRUCTURE FOLLOWING A DIGESTIVE SYSTEM PROCEDURE: ICD-10-CM

## 2024-04-09 ENCOUNTER — NON-APPOINTMENT (OUTPATIENT)
Age: 29
End: 2024-04-09

## 2024-04-25 ENCOUNTER — APPOINTMENT (OUTPATIENT)
Dept: BARIATRICS | Facility: CLINIC | Age: 29
End: 2024-04-25

## 2025-06-16 NOTE — PATIENT PROFILE ADULT - NSPROGENSOURCEINFO_GEN_A_NUR
Pt notified of results and OV scheduled w/ Dr. Farias. Pt voiced understanding.     Nuclear stress-    Stress Test: A pharmacological stress test was performed using regadenoson (Lexiscan). Hemodynamics are adequate for diagnosis. Blood pressure demonstrated a normal response and heart rate demonstrated a normal response to stress. The patient's heart rate recovery was normal.    Image quality is good.    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   patient

## (undated) DEVICE — SUT VICRYL 0 54" TIES

## (undated) DEVICE — TROCAR SURGIQUEST AIRSEAL 5MM X 100MM

## (undated) DEVICE — XI DRAPE COLUMN

## (undated) DEVICE — BLADE SCALPEL SAFETYLOCK #15

## (undated) DEVICE — XI 12MM AND STAPLER CANNULA SEAL

## (undated) DEVICE — XI STAPLER SUREFORM 60

## (undated) DEVICE — VENODYNE/SCD SLEEVE CALF MEDIUM

## (undated) DEVICE — XI DRAPE ARM

## (undated) DEVICE — TUBING AIRSEAL TRI-LUMEN FILTERED

## (undated) DEVICE — SUT ETHIBOND EXCEL 2-0 36" SH

## (undated) DEVICE — XI ENDOWRIST 12 - 8 MM CANNULA REDUCER

## (undated) DEVICE — XI OBTURATOR OPTICAL BLADELESS 8MM

## (undated) DEVICE — MARKING PEN W RULER

## (undated) DEVICE — D HELP - CLEARVIEW CLEARIFY SYSTEM

## (undated) DEVICE — PACK GENERAL LAPAROSCOPY

## (undated) DEVICE — XI SEAL UNIV 5- 8 MM

## (undated) DEVICE — ELCTR BOVIE PENCIL BLADE 10FT

## (undated) DEVICE — SUT VICRYL 3-0 27" SH

## (undated) DEVICE — Device

## (undated) DEVICE — INSUFFLATION NDL COVIDIEN SURGINEEDLE VERESS 150MM LONG

## (undated) DEVICE — SUT MONOCRYL 4-0 27" PS-2 UNDYED

## (undated) DEVICE — XI VESSEL SEALER

## (undated) DEVICE — DRSG DERMABOND 0.7ML

## (undated) DEVICE — SUT STRATAFIX SPIRAL PDS PLUS 2-0 30CM SH